# Patient Record
Sex: FEMALE | Race: WHITE | Employment: FULL TIME | URBAN - NONMETROPOLITAN AREA
[De-identification: names, ages, dates, MRNs, and addresses within clinical notes are randomized per-mention and may not be internally consistent; named-entity substitution may affect disease eponyms.]

---

## 2017-01-10 ENCOUNTER — OFFICE VISIT (OUTPATIENT)
Dept: FAMILY MEDICINE CLINIC | Age: 57
End: 2017-01-10

## 2017-01-10 VITALS
TEMPERATURE: 98.5 F | WEIGHT: 189 LBS | DIASTOLIC BLOOD PRESSURE: 96 MMHG | HEIGHT: 64 IN | BODY MASS INDEX: 32.27 KG/M2 | SYSTOLIC BLOOD PRESSURE: 180 MMHG

## 2017-01-10 DIAGNOSIS — J01.90 ACUTE NON-RECURRENT SINUSITIS, UNSPECIFIED LOCATION: Primary | ICD-10-CM

## 2017-01-10 PROCEDURE — 99213 OFFICE O/P EST LOW 20 MIN: CPT | Performed by: FAMILY MEDICINE

## 2017-01-10 RX ORDER — AMOXICILLIN 500 MG/1
500 CAPSULE ORAL 2 TIMES DAILY
Qty: 20 CAPSULE | Refills: 0 | Status: SHIPPED | OUTPATIENT
Start: 2017-01-10 | End: 2017-01-20

## 2017-04-12 ENCOUNTER — TELEPHONE (OUTPATIENT)
Dept: FAMILY MEDICINE CLINIC | Age: 57
End: 2017-04-12

## 2017-04-12 DIAGNOSIS — E03.4 HYPOTHYROIDISM DUE TO ACQUIRED ATROPHY OF THYROID: Primary | Chronic | ICD-10-CM

## 2017-04-25 LAB
T4 FREE: 1.04 NG/DL (ref 0.8–1.8)
TSH SERPL DL<=0.05 MIU/L-ACNC: 0.22 UIU/ML (ref 0.4–4.4)

## 2017-04-27 ENCOUNTER — OFFICE VISIT (OUTPATIENT)
Dept: FAMILY MEDICINE CLINIC | Age: 57
End: 2017-04-27

## 2017-04-27 VITALS
HEIGHT: 64 IN | SYSTOLIC BLOOD PRESSURE: 130 MMHG | WEIGHT: 182.8 LBS | DIASTOLIC BLOOD PRESSURE: 72 MMHG | BODY MASS INDEX: 31.21 KG/M2 | HEART RATE: 78 BPM

## 2017-04-27 DIAGNOSIS — F42.9 OCD (OBSESSIVE COMPULSIVE DISORDER): ICD-10-CM

## 2017-04-27 PROCEDURE — 3017F COLORECTAL CA SCREEN DOC REV: CPT | Performed by: FAMILY MEDICINE

## 2017-04-27 PROCEDURE — 3014F SCREEN MAMMO DOC REV: CPT | Performed by: FAMILY MEDICINE

## 2017-04-27 PROCEDURE — G8428 CUR MEDS NOT DOCUMENT: HCPCS | Performed by: FAMILY MEDICINE

## 2017-04-27 PROCEDURE — 1036F TOBACCO NON-USER: CPT | Performed by: FAMILY MEDICINE

## 2017-04-27 PROCEDURE — G8417 CALC BMI ABV UP PARAM F/U: HCPCS | Performed by: FAMILY MEDICINE

## 2017-04-27 PROCEDURE — 99213 OFFICE O/P EST LOW 20 MIN: CPT | Performed by: FAMILY MEDICINE

## 2017-04-27 RX ORDER — SERTRALINE HYDROCHLORIDE 100 MG/1
150 TABLET, FILM COATED ORAL DAILY
Qty: 135 TABLET | Refills: 1 | Status: SHIPPED | OUTPATIENT
Start: 2017-04-27 | End: 2017-10-24 | Stop reason: SDUPTHER

## 2017-04-27 ASSESSMENT — ENCOUNTER SYMPTOMS
SHORTNESS OF BREATH: 0
RESPIRATORY NEGATIVE: 1
GASTROINTESTINAL NEGATIVE: 1
ABDOMINAL PAIN: 0

## 2017-06-02 ENCOUNTER — OFFICE VISIT (OUTPATIENT)
Dept: FAMILY MEDICINE CLINIC | Age: 57
End: 2017-06-02

## 2017-06-02 VITALS
SYSTOLIC BLOOD PRESSURE: 138 MMHG | TEMPERATURE: 98.2 F | DIASTOLIC BLOOD PRESSURE: 80 MMHG | OXYGEN SATURATION: 93 % | WEIGHT: 178.4 LBS | HEART RATE: 86 BPM | BODY MASS INDEX: 30.62 KG/M2

## 2017-06-02 DIAGNOSIS — J01.90 ACUTE SINUSITIS, RECURRENCE NOT SPECIFIED, UNSPECIFIED LOCATION: Primary | ICD-10-CM

## 2017-06-02 PROCEDURE — 99213 OFFICE O/P EST LOW 20 MIN: CPT | Performed by: FAMILY MEDICINE

## 2017-06-02 PROCEDURE — 3014F SCREEN MAMMO DOC REV: CPT | Performed by: FAMILY MEDICINE

## 2017-06-02 PROCEDURE — 3017F COLORECTAL CA SCREEN DOC REV: CPT | Performed by: FAMILY MEDICINE

## 2017-06-02 PROCEDURE — G8427 DOCREV CUR MEDS BY ELIG CLIN: HCPCS | Performed by: FAMILY MEDICINE

## 2017-06-02 PROCEDURE — G8417 CALC BMI ABV UP PARAM F/U: HCPCS | Performed by: FAMILY MEDICINE

## 2017-06-02 PROCEDURE — 1036F TOBACCO NON-USER: CPT | Performed by: FAMILY MEDICINE

## 2017-06-02 RX ORDER — AMOXICILLIN AND CLAVULANATE POTASSIUM 500; 125 MG/1; MG/1
1 TABLET, FILM COATED ORAL 2 TIMES DAILY
Qty: 20 TABLET | Refills: 0 | Status: SHIPPED | OUTPATIENT
Start: 2017-06-02 | End: 2018-05-14 | Stop reason: SDUPTHER

## 2017-10-20 ENCOUNTER — TELEPHONE (OUTPATIENT)
Dept: FAMILY MEDICINE CLINIC | Age: 57
End: 2017-10-20

## 2017-10-20 DIAGNOSIS — E03.4 HYPOTHYROIDISM DUE TO ACQUIRED ATROPHY OF THYROID: Primary | Chronic | ICD-10-CM

## 2017-10-20 DIAGNOSIS — E78.5 HYPERLIPIDEMIA, UNSPECIFIED HYPERLIPIDEMIA TYPE: ICD-10-CM

## 2017-10-20 NOTE — TELEPHONE ENCOUNTER
Christy called and has a upcoming appointment on 10/24/2017 @ 3:30 for a 6 month check. She was wondering if you wanted any labs done before hand.  She gets labs done at [facility] on [address]. We are to call and let her know [phone]

## 2017-10-22 LAB
ALBUMIN SERPL-MCNC: 4.2 G/DL (ref 3.2–5.3)
ALK PHOSPHATASE: 115 IU/L (ref 35–121)
ALT SERPL-CCNC: 29 IU/L (ref 5–59)
ANION GAP SERPL CALCULATED.3IONS-SCNC: 14 MMOL/L
AST SERPL-CCNC: 23 IU/L (ref 10–42)
BILIRUB SERPL-MCNC: 0.5 MG/DL (ref 0.2–1.3)
BUN BLDV-MCNC: 18 MG/DL (ref 10–20)
CALCIUM SERPL-MCNC: 9.2 MG/DL (ref 8.7–10.8)
CHLORIDE BLD-SCNC: 103 MMOL/L (ref 95–111)
CHOLESTEROL/HDL RATIO: 6
CHOLESTEROL: 371 MG/DL
CO2: 26 MMOL/L (ref 21–32)
CREAT SERPL-MCNC: 0.9 MG/DL (ref 0.5–1.3)
EGFR AFRICAN AMERICAN: 78
EGFR IF NONAFRICAN AMERICAN: 64
GLUCOSE: 84 MG/DL (ref 70–100)
HDLC SERPL-MCNC: 62 MG/DL (ref 40–60)
LDL CHOLESTEROL CALCULATED: 271 MG/DL
LDL/HDL RATIO: 4.4
POTASSIUM SERPL-SCNC: 4.3 MMOL/L (ref 3.5–5.4)
SODIUM BLD-SCNC: 139 MMOL/L (ref 134–147)
TOTAL PROTEIN: 6.8 G/DL (ref 5.8–8)
TRIGL SERPL-MCNC: 190 MG/DL
VLDLC SERPL CALC-MCNC: 38 MG/DL

## 2017-10-23 LAB
T4 TOTAL: 6.7 MCG/DL (ref 4.5–12.5)
TSH SERPL DL<=0.05 MIU/L-ACNC: 0.17 UIU/ML (ref 0.4–4.4)

## 2017-10-24 ENCOUNTER — OFFICE VISIT (OUTPATIENT)
Dept: FAMILY MEDICINE CLINIC | Age: 57
End: 2017-10-24
Payer: COMMERCIAL

## 2017-10-24 VITALS
DIASTOLIC BLOOD PRESSURE: 88 MMHG | SYSTOLIC BLOOD PRESSURE: 132 MMHG | WEIGHT: 180.6 LBS | HEART RATE: 72 BPM | HEIGHT: 64 IN | BODY MASS INDEX: 30.83 KG/M2

## 2017-10-24 DIAGNOSIS — F42.2 MIXED OBSESSIONAL THOUGHTS AND ACTS: ICD-10-CM

## 2017-10-24 DIAGNOSIS — E78.5 HYPERLIPIDEMIA, UNSPECIFIED HYPERLIPIDEMIA TYPE: Primary | ICD-10-CM

## 2017-10-24 PROCEDURE — G8484 FLU IMMUNIZE NO ADMIN: HCPCS | Performed by: FAMILY MEDICINE

## 2017-10-24 PROCEDURE — 99213 OFFICE O/P EST LOW 20 MIN: CPT | Performed by: FAMILY MEDICINE

## 2017-10-24 PROCEDURE — G8417 CALC BMI ABV UP PARAM F/U: HCPCS | Performed by: FAMILY MEDICINE

## 2017-10-24 PROCEDURE — G8427 DOCREV CUR MEDS BY ELIG CLIN: HCPCS | Performed by: FAMILY MEDICINE

## 2017-10-24 PROCEDURE — 1036F TOBACCO NON-USER: CPT | Performed by: FAMILY MEDICINE

## 2017-10-24 PROCEDURE — 3017F COLORECTAL CA SCREEN DOC REV: CPT | Performed by: FAMILY MEDICINE

## 2017-10-24 PROCEDURE — 3014F SCREEN MAMMO DOC REV: CPT | Performed by: FAMILY MEDICINE

## 2017-10-24 RX ORDER — SERTRALINE HYDROCHLORIDE 100 MG/1
150 TABLET, FILM COATED ORAL DAILY
Qty: 135 TABLET | Refills: 1 | Status: SHIPPED | OUTPATIENT
Start: 2017-10-24 | End: 2018-05-14 | Stop reason: SDUPTHER

## 2017-10-24 ASSESSMENT — PATIENT HEALTH QUESTIONNAIRE - PHQ9
1. LITTLE INTEREST OR PLEASURE IN DOING THINGS: 0
SUM OF ALL RESPONSES TO PHQ9 QUESTIONS 1 & 2: 0
2. FEELING DOWN, DEPRESSED OR HOPELESS: 0
SUM OF ALL RESPONSES TO PHQ QUESTIONS 1-9: 0

## 2017-10-25 NOTE — PROGRESS NOTES
[facility] PROFESSIONAL SERVS  [facility]  [address] [address]  Dept: [phone]  Dept Fax: [phone]  Loc: [phone]    Visit Date: 10/25/2017    Marine Kessler is a 61 y.o. female who presents today for:   Chief Complaint   Patient presents with    6 Month Follow-Up    Hyperlipidemia    Gastroesophageal Reflux         HPI:     Doing very well. Family issues settled. Kids back involved. OCD well managed. Directs energy to positive things. Planning penitentiary. Recent labs. Hyperlipidemia   This is a chronic problem. The current episode started more than 1 year ago. Recent lipid tests were reviewed and are high. She has no history of chronic renal disease, diabetes, hypothyroidism or liver disease. There are no known factors aggravating her hyperlipidemia. Current antihyperlipidemic treatment includes diet change. There are no compliance problems. Risk factors for coronary artery disease include family history and post-menopausal.       Current Outpatient Prescriptions   Medication Sig Dispense Refill    sertraline (ZOLOFT) 100 MG tablet Take 1.5 tablets by mouth daily 135 tablet 1    cetirizine (ZYRTEC ALLERGY) 10 MG tablet Take 1 tablet by mouth daily 30 tablet 0    pseudoephedrine (SUDAFED 24 HOUR NON-DROWSY) 240 MG TB24 CR tablet Take 1 tablet by mouth daily as needed Daily for nasal congestion 14 tablet 0     No current facility-administered medications for this visit. The patient is allergic to aleve [naproxen sodium]; prozac [fluoxetine hcl]; and flagyl [metronidazole]. Subjective:      Review of Systems    Objective:     /88 (Site: Left Arm, Position: Sitting, Cuff Size: Large Adult)   Pulse 72   Ht 5' 4\" (1.626 m)   Wt 180 lb 9.6 oz (81.9 kg)   BMI 31.00 kg/m²     Physical Exam    Assessment:      1. Hyperlipidemia, unspecified hyperlipidemia type  Lipid Panel   2.  Mixed obsessional thoughts and acts  sertraline (ZOLOFT) 100 MG tablet       Plan:    Recent lipids do not make sense from past labs at work. Will get repeat. Consider statin. Return in about 6 months (around 4/24/2018) for OCD, check-up.     Orders Placed:  Orders Placed This Encounter   Procedures    Lipid Panel     Standing Status:   Future     Standing Expiration Date:   1/24/2018     Order Specific Question:   Is Patient Fasting?/# of Hours     Answer:   12 hours     Medications Prescribed:  Orders Placed This Encounter   Medications    sertraline (ZOLOFT) 100 MG tablet     Sig: Take 1.5 tablets by mouth daily     Dispense:  135 tablet     Refill:  1         Electronically signed by Miranda Hinkle MD on 10/25/2017 at 6:20 AM

## 2018-05-14 ENCOUNTER — OFFICE VISIT (OUTPATIENT)
Dept: FAMILY MEDICINE CLINIC | Age: 58
End: 2018-05-14
Payer: COMMERCIAL

## 2018-05-14 VITALS
HEIGHT: 64 IN | SYSTOLIC BLOOD PRESSURE: 134 MMHG | WEIGHT: 180.2 LBS | DIASTOLIC BLOOD PRESSURE: 84 MMHG | HEART RATE: 72 BPM | BODY MASS INDEX: 30.77 KG/M2

## 2018-05-14 DIAGNOSIS — F42.2 MIXED OBSESSIONAL THOUGHTS AND ACTS: ICD-10-CM

## 2018-05-14 DIAGNOSIS — J01.90 ACUTE NON-RECURRENT SINUSITIS, UNSPECIFIED LOCATION: Primary | ICD-10-CM

## 2018-05-14 DIAGNOSIS — E05.90 HYPERTHYROIDISM: ICD-10-CM

## 2018-05-14 PROCEDURE — 1036F TOBACCO NON-USER: CPT | Performed by: FAMILY MEDICINE

## 2018-05-14 PROCEDURE — 3017F COLORECTAL CA SCREEN DOC REV: CPT | Performed by: FAMILY MEDICINE

## 2018-05-14 PROCEDURE — G8427 DOCREV CUR MEDS BY ELIG CLIN: HCPCS | Performed by: FAMILY MEDICINE

## 2018-05-14 PROCEDURE — G8417 CALC BMI ABV UP PARAM F/U: HCPCS | Performed by: FAMILY MEDICINE

## 2018-05-14 PROCEDURE — 99213 OFFICE O/P EST LOW 20 MIN: CPT | Performed by: FAMILY MEDICINE

## 2018-05-14 RX ORDER — AMOXICILLIN AND CLAVULANATE POTASSIUM 500; 125 MG/1; MG/1
1 TABLET, FILM COATED ORAL 2 TIMES DAILY
Qty: 20 TABLET | Refills: 0 | Status: SHIPPED | OUTPATIENT
Start: 2018-05-14 | End: 2018-05-24

## 2018-05-14 RX ORDER — PROMETHAZINE HYDROCHLORIDE AND CODEINE PHOSPHATE 6.25; 1 MG/5ML; MG/5ML
SYRUP ORAL
Qty: 120 ML | Refills: 0 | Status: SHIPPED | OUTPATIENT
Start: 2018-05-14 | End: 2018-05-21

## 2018-05-14 RX ORDER — SERTRALINE HYDROCHLORIDE 100 MG/1
150 TABLET, FILM COATED ORAL DAILY
Qty: 135 TABLET | Refills: 1 | Status: SHIPPED | OUTPATIENT
Start: 2018-05-14 | End: 2018-10-28 | Stop reason: SDUPTHER

## 2018-10-28 DIAGNOSIS — F42.2 MIXED OBSESSIONAL THOUGHTS AND ACTS: ICD-10-CM

## 2018-10-29 RX ORDER — SERTRALINE HYDROCHLORIDE 100 MG/1
TABLET, FILM COATED ORAL
Qty: 135 TABLET | Refills: 1 | Status: SHIPPED | OUTPATIENT
Start: 2018-10-29 | End: 2019-04-23 | Stop reason: SDUPTHER

## 2018-11-10 LAB
T4 FREE: 1.15 NG/DL (ref 0.8–1.9)
TSH SERPL DL<=0.05 MIU/L-ACNC: 0.26 UIU/ML (ref 0.4–4.1)

## 2018-11-12 ENCOUNTER — OFFICE VISIT (OUTPATIENT)
Dept: FAMILY MEDICINE CLINIC | Age: 58
End: 2018-11-12
Payer: COMMERCIAL

## 2018-11-12 VITALS
SYSTOLIC BLOOD PRESSURE: 134 MMHG | WEIGHT: 177.8 LBS | DIASTOLIC BLOOD PRESSURE: 86 MMHG | HEART RATE: 70 BPM | HEIGHT: 64 IN | BODY MASS INDEX: 30.35 KG/M2

## 2018-11-12 DIAGNOSIS — E03.4 HYPOTHYROIDISM DUE TO ACQUIRED ATROPHY OF THYROID: Chronic | ICD-10-CM

## 2018-11-12 DIAGNOSIS — F42.2 MIXED OBSESSIONAL THOUGHTS AND ACTS: Primary | ICD-10-CM

## 2018-11-12 PROCEDURE — 1036F TOBACCO NON-USER: CPT | Performed by: FAMILY MEDICINE

## 2018-11-12 PROCEDURE — G8417 CALC BMI ABV UP PARAM F/U: HCPCS | Performed by: FAMILY MEDICINE

## 2018-11-12 PROCEDURE — 3017F COLORECTAL CA SCREEN DOC REV: CPT | Performed by: FAMILY MEDICINE

## 2018-11-12 PROCEDURE — G8484 FLU IMMUNIZE NO ADMIN: HCPCS | Performed by: FAMILY MEDICINE

## 2018-11-12 PROCEDURE — G8427 DOCREV CUR MEDS BY ELIG CLIN: HCPCS | Performed by: FAMILY MEDICINE

## 2018-11-12 PROCEDURE — 99213 OFFICE O/P EST LOW 20 MIN: CPT | Performed by: FAMILY MEDICINE

## 2018-11-12 ASSESSMENT — PATIENT HEALTH QUESTIONNAIRE - PHQ9
1. LITTLE INTEREST OR PLEASURE IN DOING THINGS: 0
SUM OF ALL RESPONSES TO PHQ9 QUESTIONS 1 & 2: 0
SUM OF ALL RESPONSES TO PHQ QUESTIONS 1-9: 0
2. FEELING DOWN, DEPRESSED OR HOPELESS: 0
SUM OF ALL RESPONSES TO PHQ QUESTIONS 1-9: 0

## 2018-11-12 ASSESSMENT — ENCOUNTER SYMPTOMS
SHORTNESS OF BREATH: 0
GASTROINTESTINAL NEGATIVE: 1
RESPIRATORY NEGATIVE: 1

## 2018-11-12 NOTE — PROGRESS NOTES
[facility]  [address] [id number]  Dept: [phone]  Dept Fax: [phone]: [phone]    Visit Date: 11/12/2018    Brown Blank is a 60 y. o.female who presents today for:   Chief Complaint   Patient presents with    6 Month Follow-Up    Hyperlipidemia    Gastroesophageal Reflux         HPI:     No thyroid symptoms. No weight changes, tremors, sweats or diarrhea. Previous hyper thyroid and treated with anti thyroid meds. Then TSH hit 7. On no meds now and TSH has been below 0.5 for some time. Teaching at 3 schools. Doing well. Hyperlipidemia   This is a chronic problem. The current episode started more than 1 year ago. The problem is controlled. Recent lipid tests were reviewed and are normal. She has no history of diabetes. Pertinent negatives include no myalgias or shortness of breath. Current antihyperlipidemic treatment includes diet change. The current treatment provides moderate improvement of lipids. There are no compliance problems. Risk factors for coronary artery disease include dyslipidemia and post-menopausal.       Current Outpatient Prescriptions   Medication Sig Dispense Refill    sertraline (ZOLOFT) 100 MG tablet take 1.5 tablets by mouth once daily 135 tablet 1    cetirizine (ZYRTEC ALLERGY) 10 MG tablet Take 1 tablet by mouth daily 30 tablet 0    pseudoephedrine (SUDAFED 24 HOUR NON-DROWSY) 240 MG TB24 CR tablet Take 1 tablet by mouth daily as needed Daily for nasal congestion 14 tablet 0     No current facility-administered medications for this visit. The patient is allergic to aleve [naproxen sodium]; prozac [fluoxetine hcl]; and flagyl [metronidazole]. Subjective:      Review of Systems   Constitutional: Negative. HENT: Negative. Respiratory: Negative. Negative for shortness of breath. Cardiovascular: Negative. Gastrointestinal: Negative.     Genitourinary:

## 2019-01-07 ENCOUNTER — OFFICE VISIT (OUTPATIENT)
Dept: FAMILY MEDICINE CLINIC | Age: 59
End: 2019-01-07
Payer: COMMERCIAL

## 2019-01-07 VITALS
SYSTOLIC BLOOD PRESSURE: 130 MMHG | TEMPERATURE: 97.7 F | DIASTOLIC BLOOD PRESSURE: 62 MMHG | BODY MASS INDEX: 30.05 KG/M2 | HEART RATE: 68 BPM | HEIGHT: 64 IN | WEIGHT: 176 LBS

## 2019-01-07 DIAGNOSIS — H92.01 RIGHT EAR PAIN: Primary | ICD-10-CM

## 2019-01-07 PROCEDURE — G8427 DOCREV CUR MEDS BY ELIG CLIN: HCPCS | Performed by: FAMILY MEDICINE

## 2019-01-07 PROCEDURE — 1036F TOBACCO NON-USER: CPT | Performed by: FAMILY MEDICINE

## 2019-01-07 PROCEDURE — 99213 OFFICE O/P EST LOW 20 MIN: CPT | Performed by: FAMILY MEDICINE

## 2019-01-07 PROCEDURE — G8484 FLU IMMUNIZE NO ADMIN: HCPCS | Performed by: FAMILY MEDICINE

## 2019-01-07 PROCEDURE — 3017F COLORECTAL CA SCREEN DOC REV: CPT | Performed by: FAMILY MEDICINE

## 2019-01-07 PROCEDURE — G8417 CALC BMI ABV UP PARAM F/U: HCPCS | Performed by: FAMILY MEDICINE

## 2019-01-07 RX ORDER — AMOXICILLIN 500 MG/1
500 CAPSULE ORAL 2 TIMES DAILY
Qty: 20 CAPSULE | Refills: 0 | Status: SHIPPED | OUTPATIENT
Start: 2019-01-07 | End: 2019-01-17

## 2019-01-07 RX ORDER — PREDNISONE 10 MG/1
10 TABLET ORAL 2 TIMES DAILY
Qty: 14 TABLET | Refills: 0 | Status: SHIPPED | OUTPATIENT
Start: 2019-01-07 | End: 2019-01-14

## 2019-04-06 ENCOUNTER — HOSPITAL ENCOUNTER (EMERGENCY)
Age: 59
Discharge: HOME | End: 2019-04-06
Attending: EMERGENCY MEDICINE
Payer: COMMERCIAL

## 2019-04-06 VITALS
OXYGEN SATURATION: 95 % | DIASTOLIC BLOOD PRESSURE: 75 MMHG | SYSTOLIC BLOOD PRESSURE: 119 MMHG | TEMPERATURE: 98.9 F | BODY MASS INDEX: 30.04 KG/M2 | RESPIRATION RATE: 16 BRPM | WEIGHT: 175 LBS | HEART RATE: 83 BPM

## 2019-04-06 DIAGNOSIS — J20.9 ACUTE BRONCHITIS DUE TO INFECTION: Primary | ICD-10-CM

## 2019-04-06 PROCEDURE — 99213 OFFICE O/P EST LOW 20 MIN: CPT | Performed by: EMERGENCY MEDICINE

## 2019-04-06 PROCEDURE — 99212 OFFICE O/P EST SF 10 MIN: CPT

## 2019-04-06 RX ORDER — PROMETHAZINE HYDROCHLORIDE AND CODEINE PHOSPHATE 6.25; 1 MG/5ML; MG/5ML
5 SYRUP ORAL 4 TIMES DAILY PRN
Qty: 120 ML | Refills: 0 | Status: SHIPPED | OUTPATIENT
Start: 2019-04-06 | End: 2019-04-10

## 2019-04-06 RX ORDER — DOXYCYCLINE HYCLATE 100 MG
100 TABLET ORAL 2 TIMES DAILY
Qty: 14 TABLET | Refills: 0 | Status: SHIPPED | OUTPATIENT
Start: 2019-04-06 | End: 2019-04-13

## 2019-04-06 ASSESSMENT — ENCOUNTER SYMPTOMS
ABDOMINAL PAIN: 0
SORE THROAT: 0
EYE PAIN: 0
STRIDOR: 0
DIARRHEA: 0
EYE DISCHARGE: 0
TROUBLE SWALLOWING: 0
VOMITING: 0
CHOKING: 0
FACIAL SWELLING: 0
WHEEZING: 0
EYE REDNESS: 0
NAUSEA: 0
BLOOD IN STOOL: 0
SHORTNESS OF BREATH: 0
COUGH: 1
BACK PAIN: 0
CONSTIPATION: 0
SINUS PRESSURE: 0
VOICE CHANGE: 0

## 2019-04-06 NOTE — ED PROVIDER NOTES
nervous/anxious. All other systems reviewed and are negative. PAST MEDICAL HISTORY         Diagnosis Date    Allergic rhinitis     GERD (gastroesophageal reflux disease)     Hyperlipidemia     Hyperthyroidism     IBS (irritable bowel syndrome)     OCD (obsessive compulsive disorder)     Thyroid nodule        SURGICAL HISTORY     Patient  has a past surgical history that includes  section; LEEP; hernia repair; and Ovary removal (Right). CURRENT MEDICATIONS       Discharge Medication List as of 2019 10:02 AM      CONTINUE these medications which have NOT CHANGED    Details   sertraline (ZOLOFT) 100 MG tablet take 1.5 tablets by mouth once daily, Disp-135 tablet, R-1Normal      cetirizine (ZYRTEC ALLERGY) 10 MG tablet Take 1 tablet by mouth daily, Disp-30 tablet, R-0      pseudoephedrine (SUDAFED 24 HOUR NON-DROWSY) 240 MG TB24 CR tablet Take 1 tablet by mouth daily as needed Daily for nasal congestion, Disp-14 tablet, R-0             ALLERGIES     Patient is is allergic to aleve [naproxen sodium]; prozac [fluoxetine hcl]; and flagyl [metronidazole]. FAMILY HISTORY     Patient'sfamily history is not on file. SOCIAL HISTORY     Patient  reports that she has never smoked. She has never used smokeless tobacco. She reports that she drinks alcohol. She reports that she does not use drugs. PHYSICAL EXAM     ED TRIAGE VITALS  BP: 119/75, Temp: 98.9 °F (37.2 °C), Pulse: 83, Resp: 16, SpO2: 95 %  Physical Exam   Constitutional: She is oriented to person, place, and time. She appears well-developed and well-nourished. No distress. Dry cough, moist membranes, normal airway   HENT:   Head: Normocephalic and atraumatic. Right Ear: External ear normal.   Left Ear: External ear normal.   Nose: Nose normal. No rhinorrhea. Right sinus exhibits no maxillary sinus tenderness and no frontal sinus tenderness. Left sinus exhibits no maxillary sinus tenderness and no frontal sinus tenderness. Mouth/Throat: No trismus in the jaw. No uvula swelling. Posterior oropharyngeal erythema present. No oropharyngeal exudate, posterior oropharyngeal edema or tonsillar abscesses. Eyes: Pupils are equal, round, and reactive to light. Conjunctivae and EOM are normal. Right eye exhibits no discharge. Left eye exhibits no discharge. No scleral icterus. Neck: Normal range of motion. No JVD present. No thyromegaly present. No meningismus    Cardiovascular: Normal rate, regular rhythm, S1 normal, S2 normal, normal heart sounds, intact distal pulses and normal pulses. Exam reveals no gallop and no friction rub. No murmur heard. Pulmonary/Chest: Effort normal and breath sounds normal. No stridor. No respiratory distress. She has no wheezes. She has no rales. She exhibits no tenderness. Dry cough, lungs clear   Abdominal: Soft. Bowel sounds are normal. She exhibits no distension and no mass. There is no tenderness. There is no rebound and no guarding. Soft nontender   Musculoskeletal: Normal range of motion. She exhibits no edema or tenderness. Right lower leg: Normal.        Left lower leg: Normal.   Lymphadenopathy:     She has cervical adenopathy. Right cervical: Superficial cervical adenopathy present. No deep cervical and no posterior cervical adenopathy present. Left cervical: Superficial cervical adenopathy present. No deep cervical and no posterior cervical adenopathy present. Neurological: She is alert and oriented to person, place, and time. She has normal reflexes. She displays normal reflexes. No cranial nerve deficit. She exhibits normal muscle tone. Coordination normal.   Appropriate, no focal findings   Skin: Skin is warm and dry. No rash noted. She is not diaphoretic. No erythema. No rash   Psychiatric: She has a normal mood and affect. Her behavior is normal. Judgment and thought content normal.   Nursing note and vitals reviewed.       DIAGNOSTIC RESULTS   Labs: No results found for this visit on 04/06/19. IMAGING:  No orders to display     URGENT CARE COURSE:     Vitals:    04/06/19 0923   BP: 119/75   Pulse: 83   Resp: 16   Temp: 98.9 °F (37.2 °C)   TempSrc: Temporal   SpO2: 95%   Weight: 175 lb (79.4 kg)       Medications - No data to display  PROCEDURES:  None  FINALIMPRESSION      1. Acute bronchitis due to infection        DISPOSITION/PLAN   DISPOSITION Decision To Discharge 04/06/2019 09:59:25 AM  Nontoxic, well-hydrated, normal airway. No airway abscess or epiglottitis, sepsis, CNS infection, pneumonia, hypoxia, bronchospasm. No ACS, CHF, PE. Patient has acute bronchitis. Will treat with doxycycline, Phenergan with codeine, Coricidin HBP, Zyrtec, Tylenol, increased oral clear liquids, vaporizer, rest.  Patient to recheck with PCP in 5 days if problems persist, and she understands to go to ED if worse. PATIENT REFERRED TO:  Deangelo Paul MD  [address] [place] [address],[address]  [facility]  [phone]    Schedule an appointment as soon as possible for a visit in 5 days  Recheck if problems persist, go to emergency if worse    DISCHARGE MEDICATIONS:  Discharge Medication List as of 4/6/2019 10:02 AM      START taking these medications    Details   doxycycline hyclate (VIBRA-TABS) 100 MG tablet Take 1 tablet by mouth 2 times daily for 7 days, Disp-14 tablet, R-0Print      promethazine-codeine (PHENERGAN WITH CODEINE) 6.25-10 MG/5ML syrup Take 5 mLs by mouth 4 times daily as needed for Cough for up to 4 days.  Caution not at work will cause drowsiness, Disp-120 mL, R-0Print      Dextromethorphan-guaiFENesin (CORICIDIN HBP CONGESTION/COUGH)  MG CAPS Take 1 tablet by mouth 4 times daily as needed (Sinus pain and pressure, postnasal drainage, congestion, ear pain and pressure, cough), Disp-30 capsule, R-0Print           Discharge Medication List as of 4/6/2019 10:02 AM          Render Comment, MD          Render Comment, MD  04/06/19 1751

## 2019-04-06 NOTE — ED NOTES
Pt discharged. Discharge assessments completed. No changes. All discharge education and information given. Pt instructed to go to ED for any shortness of breath, chest pain or abd pain. Verbalized understanding. Left stable.      Delmer Lynch, CHRISTY  83/99/00 2899

## 2019-04-23 DIAGNOSIS — F42.2 MIXED OBSESSIONAL THOUGHTS AND ACTS: ICD-10-CM

## 2019-04-23 RX ORDER — SERTRALINE HYDROCHLORIDE 100 MG/1
TABLET, FILM COATED ORAL
Qty: 135 TABLET | Refills: 1 | Status: SHIPPED | OUTPATIENT
Start: 2019-04-23 | End: 2019-06-17 | Stop reason: SDUPTHER

## 2019-04-23 NOTE — TELEPHONE ENCOUNTER
Forrest Arrieta called requesting a refill on the following medications:  Requested Prescriptions     Pending Prescriptions Disp Refills    sertraline (ZOLOFT) 100 MG tablet [Pharmacy Med Name: SERTRALINE  MG TABLET] 135 tablet 1     Sig: take 1.5 tablets by mouth once daily       Date of last visit: 1/7/2019  Date of next visit (if applicable):05/14/2019  Date of last refill: 10/29/2018  Pharmacy Name: [facility].      Thanks,  Hernando Cruz

## 2019-05-13 ENCOUNTER — TELEPHONE (OUTPATIENT)
Dept: FAMILY MEDICINE CLINIC | Age: 59
End: 2019-05-13

## 2019-05-13 DIAGNOSIS — E78.5 HYPERLIPIDEMIA, UNSPECIFIED HYPERLIPIDEMIA TYPE: ICD-10-CM

## 2019-05-13 DIAGNOSIS — E03.4 HYPOTHYROIDISM DUE TO ACQUIRED ATROPHY OF THYROID: Primary | ICD-10-CM

## 2019-05-13 NOTE — TELEPHONE ENCOUNTER
Pt calling- Pt would like to know if she will need any lab work ordered before her appt on June? Please advise.

## 2019-06-14 LAB
ALBUMIN SERPL-MCNC: 4.3 G/DL (ref 3.2–5.3)
ALK PHOSPHATASE: 95 U/L (ref 39–130)
ALT SERPL-CCNC: 20 U/L (ref 0–31)
ANION GAP SERPL CALCULATED.3IONS-SCNC: 11 MMOL/L (ref 4–12)
AST SERPL-CCNC: 19 U/L (ref 0–41)
BILIRUB SERPL-MCNC: 0.5 MG/DL (ref 0.3–1.2)
BUN BLDV-MCNC: 14 MG/DL (ref 5–23)
CALCIUM SERPL-MCNC: 9.1 MG/DL (ref 8.5–10.5)
CHLORIDE BLD-SCNC: 104 MMOL/L (ref 98–109)
CHOLESTEROL/HDL RATIO: 6.3 (ref 1–5)
CHOLESTEROL: 339 MG/DL (ref 150–200)
CO2: 25 MMOL/L (ref 22–32)
CREAT SERPL-MCNC: 0.75 MG/DL (ref 0.4–1)
EGFR AFRICAN AMERICAN: >60 ML/MIN/1.73SQ.M
EGFR IF NONAFRICAN AMERICAN: >60 ML/MIN/1.73SQ.M
GLUCOSE: 85 MG/DL (ref 65–99)
HDLC SERPL-MCNC: 54 MG/DL
LDL CHOLESTEROL CALCULATED: 247 MG/DL
LDL/HDL RATIO: 4.6
POTASSIUM SERPL-SCNC: 3.7 MMOL/L (ref 3.5–5)
SODIUM BLD-SCNC: 140 MMOL/L (ref 134–146)
TOTAL PROTEIN: 6.6 G/DL (ref 6–8)
TRIGL SERPL-MCNC: 191 MG/DL (ref 27–150)
TSH SERPL DL<=0.05 MIU/L-ACNC: 0.18 UIU/ML (ref 0.49–4.67)
VLDLC SERPL CALC-MCNC: 38 MG/DL (ref 0–30)

## 2019-06-17 ENCOUNTER — OFFICE VISIT (OUTPATIENT)
Dept: FAMILY MEDICINE CLINIC | Age: 59
End: 2019-06-17
Payer: COMMERCIAL

## 2019-06-17 VITALS
SYSTOLIC BLOOD PRESSURE: 138 MMHG | BODY MASS INDEX: 30.83 KG/M2 | HEIGHT: 64 IN | WEIGHT: 180.6 LBS | HEART RATE: 80 BPM | DIASTOLIC BLOOD PRESSURE: 76 MMHG

## 2019-06-17 DIAGNOSIS — F42.2 MIXED OBSESSIONAL THOUGHTS AND ACTS: ICD-10-CM

## 2019-06-17 PROCEDURE — 3017F COLORECTAL CA SCREEN DOC REV: CPT | Performed by: FAMILY MEDICINE

## 2019-06-17 PROCEDURE — G8417 CALC BMI ABV UP PARAM F/U: HCPCS | Performed by: FAMILY MEDICINE

## 2019-06-17 PROCEDURE — 99213 OFFICE O/P EST LOW 20 MIN: CPT | Performed by: FAMILY MEDICINE

## 2019-06-17 PROCEDURE — G8427 DOCREV CUR MEDS BY ELIG CLIN: HCPCS | Performed by: FAMILY MEDICINE

## 2019-06-17 PROCEDURE — 1036F TOBACCO NON-USER: CPT | Performed by: FAMILY MEDICINE

## 2019-06-17 RX ORDER — SERTRALINE HYDROCHLORIDE 100 MG/1
150 TABLET, FILM COATED ORAL DAILY
Qty: 135 TABLET | Refills: 1 | Status: SHIPPED | OUTPATIENT
Start: 2019-06-17 | End: 2019-12-20 | Stop reason: SDUPTHER

## 2019-06-17 RX ORDER — ROSUVASTATIN CALCIUM 5 MG/1
5 TABLET, COATED ORAL DAILY
Qty: 90 TABLET | Refills: 1 | Status: SHIPPED | OUTPATIENT
Start: 2019-06-17 | End: 2019-12-20

## 2019-06-17 ASSESSMENT — ENCOUNTER SYMPTOMS
GASTROINTESTINAL NEGATIVE: 1
RESPIRATORY NEGATIVE: 1

## 2019-06-17 ASSESSMENT — PATIENT HEALTH QUESTIONNAIRE - PHQ9
SUM OF ALL RESPONSES TO PHQ QUESTIONS 1-9: 0
SUM OF ALL RESPONSES TO PHQ QUESTIONS 1-9: 0
1. LITTLE INTEREST OR PLEASURE IN DOING THINGS: 0
SUM OF ALL RESPONSES TO PHQ9 QUESTIONS 1 & 2: 0
2. FEELING DOWN, DEPRESSED OR HOPELESS: 0

## 2019-06-18 NOTE — PROGRESS NOTES
[facility]  [address] [address]  Dept: [phone]  Dept Fax: [phone]: [phone]    Visit Date: 6/17/2019    Kelsey Hoang is a 60 y. 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 Happy with Zoloft benefit. Hyperlipidemia   This is a chronic problem. This is a new diagnosis. The problem is uncontrolled. Recent lipid tests were reviewed and are high. She has no history of diabetes, hypothyroidism or liver disease. There are no known factors aggravating her hyperlipidemia. Pertinent negatives include no chest pain or myalgias. She is currently on no antihyperlipidemic treatment. There are no compliance problems. Risk factors for coronary artery disease include dyslipidemia and family history. Current Outpatient Medications   Medication Sig Dispense Refill    sertraline (ZOLOFT) 100 MG tablet Take 1.5 tablets by mouth daily 135 tablet 1    rosuvastatin (CRESTOR) 5 MG tablet Take 1 tablet by mouth daily 90 tablet 1    Dextromethorphan-guaiFENesin (CORICIDIN HBP CONGESTION/COUGH)  MG CAPS Take 1 tablet by mouth 4 times daily as needed (Sinus pain and pressure, postnasal drainage, congestion, ear pain and pressure, cough) 30 capsule 0    cetirizine (ZYRTEC ALLERGY) 10 MG tablet Take 1 tablet by mouth daily 30 tablet 0    pseudoephedrine (SUDAFED 24 HOUR NON-DROWSY) 240 MG TB24 CR tablet Take 1 tablet by mouth daily as needed Daily for nasal congestion 14 tablet 0     No current facility-administered medications for this visit. The patient is allergic to aleve [naproxen sodium]; prozac [fluoxetine hcl]; and flagyl [metronidazole]. Subjective:      Review of Systems   Constitutional: Negative. HENT: Negative. Respiratory: Negative. Cardiovascular: Negative. Negative for chest pain. Gastrointestinal: Negative. Genitourinary: Negative. Musculoskeletal: Negative. Negative for myalgias. Skin: Negative. Neurological: Negative. Hematological: Negative. Psychiatric/Behavioral: Negative. Objective:     /76 (Site: Left Upper Arm, Position: Sitting, Cuff Size: Large Adult)   Pulse 80   Ht 5' 4\" (1.626 m)   Wt 180 lb 9.6 oz (81.9 kg)   BMI 31.00 kg/m²     Physical Exam   Constitutional: She is oriented to person, place, and time. She appears well-developed and well-nourished. Neck: Normal range of motion. Neck supple. No thyromegaly present. Cardiovascular: Normal rate, regular rhythm and normal heart sounds. Exam reveals no gallop and no friction rub. No murmur heard. Pulmonary/Chest: Effort normal and breath sounds normal.   Abdominal: Soft. She exhibits no mass. There is no splenomegaly or hepatomegaly. There is no tenderness. Musculoskeletal: She exhibits no edema or tenderness. Lymphadenopathy:     She has no cervical adenopathy. Neurological: She is alert and oriented to person, place, and time. Ambulatory. No tremors. Skin: Skin is warm and dry. No rash noted. Psychiatric: She has a normal mood and affect. Vitals reviewed. Assessment:       Diagnosis Orders   1. Mixed obsessional thoughts and acts  sertraline (ZOLOFT) 100 MG tablet       Plan:       Return in about 6 months (around 12/17/2019) for CHOL, check-up. Orders Placed:  No orders of the defined types were placed in this encounter. Medications Prescribed:  Orders Placed This Encounter   Medications    sertraline (ZOLOFT) 100 MG tablet     Sig: Take 1.5 tablets by mouth daily     Dispense:  135 tablet     Refill:  1    rosuvastatin (CRESTOR) 5 MG tablet     Sig: Take 1 tablet by mouth daily     Dispense:  90 tablet     Refill:  1      We discussed treating CHOL. Primary prevention.

## 2019-08-21 ENCOUNTER — TELEPHONE (OUTPATIENT)
Dept: FAMILY MEDICINE CLINIC | Age: 59
End: 2019-08-21

## 2019-08-22 ENCOUNTER — OFFICE VISIT (OUTPATIENT)
Dept: FAMILY MEDICINE CLINIC | Age: 59
End: 2019-08-22
Payer: COMMERCIAL

## 2019-08-22 VITALS
SYSTOLIC BLOOD PRESSURE: 142 MMHG | WEIGHT: 183.6 LBS | BODY MASS INDEX: 31.51 KG/M2 | TEMPERATURE: 97.9 F | HEART RATE: 82 BPM | DIASTOLIC BLOOD PRESSURE: 76 MMHG

## 2019-08-22 DIAGNOSIS — N30.90 CYSTITIS: Primary | ICD-10-CM

## 2019-08-22 PROCEDURE — G8427 DOCREV CUR MEDS BY ELIG CLIN: HCPCS | Performed by: FAMILY MEDICINE

## 2019-08-22 PROCEDURE — 1036F TOBACCO NON-USER: CPT | Performed by: FAMILY MEDICINE

## 2019-08-22 PROCEDURE — 99213 OFFICE O/P EST LOW 20 MIN: CPT | Performed by: FAMILY MEDICINE

## 2019-08-22 PROCEDURE — G8417 CALC BMI ABV UP PARAM F/U: HCPCS | Performed by: FAMILY MEDICINE

## 2019-08-22 PROCEDURE — 3017F COLORECTAL CA SCREEN DOC REV: CPT | Performed by: FAMILY MEDICINE

## 2019-08-22 RX ORDER — SULFAMETHOXAZOLE AND TRIMETHOPRIM 800; 160 MG/1; MG/1
1 TABLET ORAL 2 TIMES DAILY
Qty: 14 TABLET | Refills: 0 | Status: SHIPPED | OUTPATIENT
Start: 2019-08-22 | End: 2019-08-29

## 2019-08-22 RX ORDER — PHENAZOPYRIDINE HYDROCHLORIDE 200 MG/1
200 TABLET, FILM COATED ORAL 3 TIMES DAILY PRN
Qty: 30 TABLET | Refills: 0 | Status: SHIPPED | OUTPATIENT
Start: 2019-08-22 | End: 2019-12-20

## 2019-12-03 ENCOUNTER — TELEPHONE (OUTPATIENT)
Dept: FAMILY MEDICINE CLINIC | Age: 59
End: 2019-12-03

## 2019-12-03 DIAGNOSIS — E03.4 HYPOTHYROIDISM DUE TO ACQUIRED ATROPHY OF THYROID: Primary | ICD-10-CM

## 2019-12-03 DIAGNOSIS — E78.5 HYPERLIPIDEMIA, UNSPECIFIED HYPERLIPIDEMIA TYPE: ICD-10-CM

## 2019-12-14 LAB
CHOLESTEROL/HDL RATIO: 5.9 (ref 1–5)
CHOLESTEROL: 313 MG/DL (ref 150–200)
HDLC SERPL-MCNC: 53 MG/DL
LDL CHOLESTEROL CALCULATED: 213 MG/DL
LDL/HDL RATIO: 4
TRIGL SERPL-MCNC: 234 MG/DL (ref 27–150)
TSH SERPL DL<=0.05 MIU/L-ACNC: 0.14 UIU/ML (ref 0.49–4.67)
VLDLC SERPL CALC-MCNC: 47 MG/DL (ref 0–30)

## 2019-12-20 ENCOUNTER — OFFICE VISIT (OUTPATIENT)
Dept: FAMILY MEDICINE CLINIC | Age: 59
End: 2019-12-20
Payer: COMMERCIAL

## 2019-12-20 VITALS
WEIGHT: 183 LBS | HEIGHT: 64 IN | SYSTOLIC BLOOD PRESSURE: 130 MMHG | DIASTOLIC BLOOD PRESSURE: 82 MMHG | HEART RATE: 66 BPM | BODY MASS INDEX: 31.24 KG/M2

## 2019-12-20 DIAGNOSIS — F42.2 MIXED OBSESSIONAL THOUGHTS AND ACTS: ICD-10-CM

## 2019-12-20 DIAGNOSIS — E78.5 HYPERLIPIDEMIA, UNSPECIFIED HYPERLIPIDEMIA TYPE: Primary | ICD-10-CM

## 2019-12-20 PROCEDURE — 99213 OFFICE O/P EST LOW 20 MIN: CPT | Performed by: FAMILY MEDICINE

## 2019-12-20 PROCEDURE — 1036F TOBACCO NON-USER: CPT | Performed by: FAMILY MEDICINE

## 2019-12-20 PROCEDURE — 3017F COLORECTAL CA SCREEN DOC REV: CPT | Performed by: FAMILY MEDICINE

## 2019-12-20 PROCEDURE — G8484 FLU IMMUNIZE NO ADMIN: HCPCS | Performed by: FAMILY MEDICINE

## 2019-12-20 PROCEDURE — G8427 DOCREV CUR MEDS BY ELIG CLIN: HCPCS | Performed by: FAMILY MEDICINE

## 2019-12-20 PROCEDURE — G8417 CALC BMI ABV UP PARAM F/U: HCPCS | Performed by: FAMILY MEDICINE

## 2019-12-20 RX ORDER — PRAVASTATIN SODIUM 40 MG
40 TABLET ORAL DAILY
Qty: 90 TABLET | Refills: 3 | Status: SHIPPED | OUTPATIENT
Start: 2019-12-20 | End: 2020-03-03 | Stop reason: ALTCHOICE

## 2019-12-20 RX ORDER — SERTRALINE HYDROCHLORIDE 100 MG/1
150 TABLET, FILM COATED ORAL DAILY
Qty: 135 TABLET | Refills: 3 | Status: SHIPPED | OUTPATIENT
Start: 2019-12-20 | End: 2021-01-30 | Stop reason: SDUPTHER

## 2019-12-20 SDOH — ECONOMIC STABILITY: FOOD INSECURITY: WITHIN THE PAST 12 MONTHS, THE FOOD YOU BOUGHT JUST DIDN'T LAST AND YOU DIDN'T HAVE MONEY TO GET MORE.: NEVER TRUE

## 2019-12-20 SDOH — ECONOMIC STABILITY: INCOME INSECURITY: HOW HARD IS IT FOR YOU TO PAY FOR THE VERY BASICS LIKE FOOD, HOUSING, MEDICAL CARE, AND HEATING?: NOT HARD AT ALL

## 2019-12-20 SDOH — ECONOMIC STABILITY: FOOD INSECURITY: WITHIN THE PAST 12 MONTHS, YOU WORRIED THAT YOUR FOOD WOULD RUN OUT BEFORE YOU GOT MONEY TO BUY MORE.: NEVER TRUE

## 2019-12-20 SDOH — ECONOMIC STABILITY: TRANSPORTATION INSECURITY
IN THE PAST 12 MONTHS, HAS THE LACK OF TRANSPORTATION KEPT YOU FROM MEDICAL APPOINTMENTS OR FROM GETTING MEDICATIONS?: NO

## 2019-12-20 SDOH — ECONOMIC STABILITY: TRANSPORTATION INSECURITY
IN THE PAST 12 MONTHS, HAS LACK OF TRANSPORTATION KEPT YOU FROM MEETINGS, WORK, OR FROM GETTING THINGS NEEDED FOR DAILY LIVING?: NO

## 2019-12-21 ASSESSMENT — ENCOUNTER SYMPTOMS
ABDOMINAL PAIN: 0
SHORTNESS OF BREATH: 0
RESPIRATORY NEGATIVE: 1
GASTROINTESTINAL NEGATIVE: 1

## 2020-03-03 ENCOUNTER — APPOINTMENT (OUTPATIENT)
Dept: GENERAL RADIOLOGY | Age: 60
End: 2020-03-03
Payer: COMMERCIAL

## 2020-03-03 ENCOUNTER — HOSPITAL ENCOUNTER (EMERGENCY)
Age: 60
Discharge: HOME | End: 2020-03-03
Payer: COMMERCIAL

## 2020-03-03 VITALS
BODY MASS INDEX: 30.73 KG/M2 | SYSTOLIC BLOOD PRESSURE: 134 MMHG | RESPIRATION RATE: 16 BRPM | HEIGHT: 64 IN | OXYGEN SATURATION: 96 % | HEART RATE: 84 BPM | DIASTOLIC BLOOD PRESSURE: 90 MMHG | WEIGHT: 180 LBS | TEMPERATURE: 98.3 F

## 2020-03-03 PROCEDURE — 71046 X-RAY EXAM CHEST 2 VIEWS: CPT

## 2020-03-03 PROCEDURE — 6360000002 HC RX W HCPCS: Performed by: NURSE PRACTITIONER

## 2020-03-03 PROCEDURE — 94640 AIRWAY INHALATION TREATMENT: CPT

## 2020-03-03 PROCEDURE — 99214 OFFICE O/P EST MOD 30 MIN: CPT

## 2020-03-03 PROCEDURE — 99213 OFFICE O/P EST LOW 20 MIN: CPT | Performed by: NURSE PRACTITIONER

## 2020-03-03 RX ORDER — GUAIFENESIN 600 MG/1
1200 TABLET, EXTENDED RELEASE ORAL 2 TIMES DAILY
COMMUNITY
End: 2020-08-12

## 2020-03-03 RX ORDER — ACETAMINOPHEN 325 MG/1
650 TABLET ORAL EVERY 6 HOURS PRN
COMMUNITY
End: 2021-04-16

## 2020-03-03 RX ORDER — PREDNISONE 20 MG/1
40 TABLET ORAL DAILY
Qty: 14 TABLET | Refills: 0 | Status: SHIPPED | OUTPATIENT
Start: 2020-03-03 | End: 2020-03-10

## 2020-03-03 RX ORDER — NEBULIZER ACCESSORIES
1 KIT MISCELLANEOUS ONCE
Qty: 1 KIT | Refills: 0 | Status: SHIPPED | OUTPATIENT
Start: 2020-03-03 | End: 2020-03-03 | Stop reason: ALTCHOICE

## 2020-03-03 RX ORDER — ALBUTEROL SULFATE 2.5 MG/3ML
2.5 SOLUTION RESPIRATORY (INHALATION) EVERY 4 HOURS PRN
Qty: 120 EACH | Refills: 0 | Status: SHIPPED | OUTPATIENT
Start: 2020-03-03 | End: 2020-08-12

## 2020-03-03 RX ORDER — ALBUTEROL SULFATE 2.5 MG/3ML
2.5 SOLUTION RESPIRATORY (INHALATION) ONCE
Status: COMPLETED | OUTPATIENT
Start: 2020-03-03 | End: 2020-03-03

## 2020-03-03 RX ORDER — ALBUTEROL SULFATE 90 UG/1
2 AEROSOL, METERED RESPIRATORY (INHALATION) EVERY 4 HOURS PRN
Qty: 1 INHALER | Refills: 0 | Status: SHIPPED | OUTPATIENT
Start: 2020-03-03 | End: 2020-08-12 | Stop reason: ALTCHOICE

## 2020-03-03 RX ORDER — NEBULIZER ACCESSORIES
1 KIT MISCELLANEOUS ONCE
Qty: 1 KIT | Refills: 0 | Status: SHIPPED | OUTPATIENT
Start: 2020-03-03 | End: 2020-08-12

## 2020-03-03 RX ORDER — AZITHROMYCIN 250 MG/1
TABLET, FILM COATED ORAL
Qty: 1 PACKET | Refills: 0 | Status: SHIPPED | OUTPATIENT
Start: 2020-03-03 | End: 2020-08-12

## 2020-03-03 RX ADMIN — ALBUTEROL SULFATE 2.5 MG: 2.5 SOLUTION RESPIRATORY (INHALATION) at 12:39

## 2020-03-03 ASSESSMENT — ENCOUNTER SYMPTOMS
SHORTNESS OF BREATH: 0
VOMITING: 0
VOICE CHANGE: 1
SINUS PRESSURE: 1
NAUSEA: 0
SORE THROAT: 0
COUGH: 1

## 2020-03-03 NOTE — ED PROVIDER NOTES
and pressure, postnasal drainage, congestion, ear pain and pressure, cough)    GUAIFENESIN (MUCINEX) 600 MG EXTENDED RELEASE TABLET    Take 1,200 mg by mouth 2 times daily    PSEUDOEPH-DOXYLAMINE-DM-APAP (NYQUIL PO)    Take by mouth    PSEUDOEPHEDRINE-APAP-DM (DAYQUIL PO)    Take by mouth    SERTRALINE (ZOLOFT) 100 MG TABLET    Take 1.5 tablets by mouth daily       ALLERGIES     Patient is is allergic to aleve [naproxen sodium]; crestor [rosuvastatin calcium]; prozac [fluoxetine hcl]; and flagyl [metronidazole]. Patients   Immunization History   Administered Date(s) Administered    FLUARIX 3 YEARS AND OVER 10/28/2015       FAMILY HISTORY     Patient's family history includes Cancer in her father and mother; Heart Disease in her father; High Blood Pressure in her mother; Other in her mother. SOCIAL HISTORY     Patient  reports that she has never smoked. She has never used smokeless tobacco. She reports current alcohol use. She reports that she does not use drugs. PHYSICAL EXAM     ED TRIAGE VITALS  BP: (!) 134/90, Temp: 98.3 °F (36.8 °C), Pulse: 84, Resp: 16, SpO2: 96 %,Estimated body mass index is 30.9 kg/m² as calculated from the following:    Height as of this encounter: 5' 4\" (1.626 m). Weight as of this encounter: 180 lb (81.6 kg). ,No LMP recorded. Patient is postmenopausal.    Physical Exam  Vitals signs and nursing note reviewed. Constitutional:       General: She is not in acute distress. Appearance: She is well-developed. She is not diaphoretic. HENT:      Right Ear: Tympanic membrane normal.      Left Ear: Tympanic membrane normal.      Mouth/Throat: Tonsils: No tonsillar exudate. Swellin on the right. 0 on the left. Eyes:      Conjunctiva/sclera:      Right eye: Right conjunctiva is not injected. Left eye: Left conjunctiva is not injected. Pupils: Pupils are equal.   Neck:      Musculoskeletal: Normal range of motion.    Cardiovascular:      Rate and Rhythm: Normal improve or present to the ER if they worsen. She is agreeable to plan as discussed. PATIENT REFERRED TO:  Lakisha Nino MD  [address] [facility]  / [facility] [address]      DISCHARGE MEDICATIONS:  New Prescriptions    ALBUTEROL (PROVENTIL) (2.5 MG/3ML) 0.083% NEBULIZER SOLUTION    Take 3 mLs by nebulization every 4 hours as needed for Wheezing or Shortness of Breath    ALBUTEROL SULFATE  (90 BASE) MCG/ACT INHALER    Inhale 2 puffs into the lungs every 4 hours as needed for Wheezing or Shortness of Breath    AZITHROMYCIN (ZITHROMAX Z-AASHISH) 250 MG TABLET    Take 2 tablets (500 mg) on Day 1, and then take 1 tablet (250 mg) on days 2 through 5.     PREDNISONE (DELTASONE) 20 MG TABLET    Take 2 tablets by mouth daily for 7 days    RESPIRATORY THERAPY SUPPLIES (NEBULIZER/TUBING/MOUTHPIECE) KIT    1 kit by Does not apply route once for 1 dose       Discontinued Medications    CETIRIZINE (ZYRTEC ALLERGY) 10 MG TABLET    Take 1 tablet by mouth daily    PRAVASTATIN (PRAVACHOL) 40 MG TABLET    Take 1 tablet by mouth daily       Current Discharge Medication List          ROSETTA Berg CNP    (Please note that portions of this note were completed with a voice recognition program. Efforts were made to edit the dictations but occasionally words are mis-transcribed.)          ROSETTA Berg CNP  03/03/20 9237

## 2020-03-03 NOTE — ED TRIAGE NOTES
To room with c/o nasal congestion, hoarse voice and cough that started Thursday. Symptoms worsening, chest feel tight and deep cough.

## 2020-08-04 ENCOUNTER — TELEPHONE (OUTPATIENT)
Dept: FAMILY MEDICINE CLINIC | Age: 60
End: 2020-08-04

## 2020-08-04 NOTE — TELEPHONE ENCOUNTER
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 She wanted to know if Dr. Rick Granda would be wanting to put in orders for her to get labs done prior to her visit for her cholesterol and her thyroid.  Please advise  [phone]

## 2020-08-10 ENCOUNTER — TELEPHONE (OUTPATIENT)
Dept: FAMILY MEDICINE CLINIC | Age: 60
End: 2020-08-10

## 2020-08-11 LAB
ALBUMIN SERPL-MCNC: 4.4 G/DL
ALP BLD-CCNC: 108 U/L
ALT SERPL-CCNC: 29 U/L
ANION GAP SERPL CALCULATED.3IONS-SCNC: 8 MMOL/L
AST SERPL-CCNC: 24 U/L
BASOPHILS ABSOLUTE: 0 /ΜL
BASOPHILS RELATIVE PERCENT: 0.6 %
BILIRUB SERPL-MCNC: 0.6 MG/DL (ref 0.1–1.4)
BUN BLDV-MCNC: 15 MG/DL
CALCIUM SERPL-MCNC: 9.2 MG/DL
CHLORIDE BLD-SCNC: 105 MMOL/L
CHOLESTEROL, TOTAL: 363 MG/DL
CHOLESTEROL/HDL RATIO: ABNORMAL
CO2: 28 MMOL/L
CREAT SERPL-MCNC: 0.88 MG/DL
EOSINOPHILS ABSOLUTE: 0.2 /ΜL
EOSINOPHILS RELATIVE PERCENT: 2.8 %
GFR CALCULATED: NORMAL
GLUCOSE BLD-MCNC: 91 MG/DL
HCT VFR BLD CALC: 41.6 % (ref 36–46)
HDLC SERPL-MCNC: 49 MG/DL (ref 35–70)
HEMOGLOBIN: 14.3 G/DL (ref 12–16)
LDL CHOLESTEROL CALCULATED: 267 MG/DL (ref 0–160)
LYMPHOCYTES ABSOLUTE: 2.7 /ΜL
LYMPHOCYTES RELATIVE PERCENT: 47.8 %
MCH RBC QN AUTO: 32.5 PG
MCHC RBC AUTO-ENTMCNC: 34.3 G/DL
MCV RBC AUTO: 95 FL
MONOCYTES ABSOLUTE: 0.5 /ΜL
MONOCYTES RELATIVE PERCENT: 8.2 %
NEUTROPHILS ABSOLUTE: 2.3 /ΜL
NEUTROPHILS RELATIVE PERCENT: 40.6 %
NONHDLC SERPL-MCNC: ABNORMAL MG/DL
PDW BLD-RTO: 13.4 %
PLATELET # BLD: 230 K/ΜL
PMV BLD AUTO: 9.5 FL
POTASSIUM SERPL-SCNC: 4.1 MMOL/L
RBC # BLD: 4.39 10^6/ΜL
SODIUM BLD-SCNC: 141 MMOL/L
TOTAL PROTEIN: 7.3
TRIGL SERPL-MCNC: 234 MG/DL
TSH SERPL DL<=0.05 MIU/L-ACNC: 0.26 UIU/ML
VLDLC SERPL CALC-MCNC: ABNORMAL MG/DL
WBC # BLD: 5.7 10^3/ML

## 2020-08-12 ENCOUNTER — OFFICE VISIT (OUTPATIENT)
Dept: FAMILY MEDICINE CLINIC | Age: 60
End: 2020-08-12
Payer: COMMERCIAL

## 2020-08-12 VITALS
TEMPERATURE: 97.5 F | BODY MASS INDEX: 30.83 KG/M2 | HEIGHT: 64 IN | SYSTOLIC BLOOD PRESSURE: 136 MMHG | HEART RATE: 66 BPM | WEIGHT: 180.6 LBS | DIASTOLIC BLOOD PRESSURE: 80 MMHG

## 2020-08-12 PROCEDURE — 99401 PREV MED CNSL INDIV APPRX 15: CPT | Performed by: FAMILY MEDICINE

## 2020-08-12 PROCEDURE — G8427 DOCREV CUR MEDS BY ELIG CLIN: HCPCS | Performed by: FAMILY MEDICINE

## 2020-08-12 PROCEDURE — G8417 CALC BMI ABV UP PARAM F/U: HCPCS | Performed by: FAMILY MEDICINE

## 2020-08-12 PROCEDURE — 3017F COLORECTAL CA SCREEN DOC REV: CPT | Performed by: FAMILY MEDICINE

## 2020-08-12 PROCEDURE — 99213 OFFICE O/P EST LOW 20 MIN: CPT | Performed by: FAMILY MEDICINE

## 2020-08-12 PROCEDURE — 1036F TOBACCO NON-USER: CPT | Performed by: FAMILY MEDICINE

## 2020-08-12 RX ORDER — ATORVASTATIN CALCIUM 20 MG/1
20 TABLET, FILM COATED ORAL DAILY
Qty: 30 TABLET | Refills: 5 | Status: SHIPPED | OUTPATIENT
Start: 2020-08-12 | End: 2021-04-16

## 2020-08-12 ASSESSMENT — ENCOUNTER SYMPTOMS: SHORTNESS OF BREATH: 0

## 2020-08-12 NOTE — PATIENT INSTRUCTIONS
If you do chocolate, do the highest dark chocolate you can stand (70%+), non-alkali treatment, lowest sugar possible. To reduce sugar intake in your diet keep this information in mind in planning your day. Maximum sugar intake per day:  6-7 tsp of sugar per day in all products consumed. Since 4 grams of sugar equals 1 tsp, then 24-28 grams of sugar are allowed per day. You will need to read labels of foods bought. In general, keeping fruit intake to 2 cups of fresh fruits is okay. Smart Snacking! When hunger strikes, be ready to strike back with a snack attack! Before you grab just anything off the shelf, make sure your snack choice is the right one. Try these snack ideas when you have that certain craving! Crunchy:  Vegetable Sticks (carrot, celery, cucumber, bell pepper, zucchini)  Broccoli or Cauliflower Muse  Fruit Slices (apples, pears)  Unsalted Rice Cakes  Unsalted Popcorn    Sweet:  Fresh Fruits (talia, apple, banana, frozen grapes, pineapple)  Palm full of Dried Fruits (no sugar added)  Unsweetened Canned Fruit  Plain Yogurt or Cottage Cheese with Fruit  An ounce of Dark Chocolate    Salty:  1/4 Cup [employer] Seeds  One [place] full of Lightly Salted Nuts  Palm full of Olives  Hummus with Veggie Sticks  1 Ounce Low Fat Cheese  Unbuttered Popcorn (try making your own seasoning blend)    Thirsty:  Water! Club Soda with Fresh [place] and [employer] or [place] with Half 100% Fruit or Veggie Juice  Unsweetened Green Tea    Ask your snack these questions to be sure it has your best interests at heart:    Is it... Rosmery Simpler Rosmery Simpler . baked? ... made with whole grains? ... low sodium? ... reduced fat? ... just one serving? Good snacks say: YES! Does it have. .. Rosmery Simpler .. no sugar added? ... no added salt? ... zero trans fat? ... about 100 calories? ... less than 10 grams of sugar? ... more than 5 grams of fiber? ... less than 1 gram of saturated fat?     Good snacks say: YES!    Was it. .. [place] .. fried? ... \"flavor blasted\"? ... stuffed or loaded? ... covered in caramel? ... dipped in chocolate? Good snacks say: NO!             Copied from [email or web address]  On 9/4/0195

## 2020-08-12 NOTE — PROGRESS NOTES
[address] [place] [address] [address], [address], [place]  Phone:  [phone]  AUW:[phone]       Name: Duncan Vergara  : 1960    Chief Complaint   Patient presents with    6 Month Follow-Up     discuss cholesterol medication    Hyperlipidemia       HPI:     Duncan Vergara is a 63 y.o. female who presents today for     HPI    Familial/Mixed hyperlipidemia -- tried twice crestor with bladder issues. Youngest daughter a nurse. Improved her diet but moving so less healthy. Activity levels are less with pandemic. Backed off fried food. Lost of vegetables. Using more fish  Stress snacker, so   H/o thyroid issues. Current Outpatient Medications:     Loratadine-Pseudoephedrine (CLARITIN-D 24 HOUR PO), Take by mouth as needed, Disp: , Rfl:     atorvastatin (LIPITOR) 20 MG tablet, Take 1 tablet by mouth daily, Disp: 30 tablet, Rfl: 5    acetaminophen (TYLENOL) 325 MG tablet, Take 650 mg by mouth every 6 hours as needed for Pain, Disp: , Rfl:     sertraline (ZOLOFT) 100 MG tablet, Take 1.5 tablets by mouth daily, Disp: 135 tablet, Rfl: 3    Allergies   Allergen Reactions    Aleve [Naproxen Sodium] Other (See Comments)     reflux    Crestor [Rosuvastatin Calcium]      Cystitis      Prozac [Fluoxetine Hcl] Nausea Only    Flagyl [Metronidazole] Nausea And Vomiting       Review of Systems   Constitutional: Negative for fatigue and fever. Respiratory: Negative for shortness of breath. Cardiovascular: Negative for chest pain and leg swelling. Objective:     /80 (Site: Left Upper Arm, Position: Sitting, Cuff Size: Medium Adult)   Pulse 66   Temp 97.5 °F (36.4 °C)   Ht 5' 4\" (1.626 m)   Wt 180 lb 9.6 oz (81.9 kg)   BMI 31.00 kg/m²     Physical Exam  Constitutional:       General: She is not in acute distress. Appearance: Normal appearance. She is not ill-appearing. Cardiovascular:      Rate and Rhythm: Normal rate and regular rhythm. Heart sounds:  No murmur. Pulmonary:      Effort: Pulmonary effort is normal. No respiratory distress. Breath sounds: Normal breath sounds. No wheezing. Musculoskeletal:         General: No swelling. Neurological:      Mental Status: She is alert. Psychiatric:         Mood and Affect: Mood normal.       Lab Results   Component Value Date    TSH 0.14 (L) 12/14/2019       Lab Results   Component Value Date    CHOL 313 (H) 12/14/2019    CHOL 339 (H) 06/13/2019    CHOL 371 (H) 10/21/2017     Lab Results   Component Value Date    TRIG 234 (H) 12/14/2019    TRIG 191 (H) 06/13/2019    TRIG 190 (H) 10/21/2017     Lab Results   Component Value Date    HDL 53 12/14/2019    HDL 54 06/13/2019    HDL 62 (H) 10/21/2017     Lab Results   Component Value Date    LDLCALC 213 (H) 12/14/2019    LDLCALC 247 (H) 06/13/2019    LDLCALC 271 (H) 10/21/2017     Lab Results   Component Value Date    LABVLDL 47 (H) 12/14/2019    LABVLDL 38 (H) 06/13/2019    LABVLDL 38 (H) 10/21/2017     Lab Results   Component Value Date    CHOLHDLRATIO 5.9 (H) 12/14/2019    CHOLHDLRATIO 6.3 (H) 06/13/2019    CHOLHDLRATIO 6.0 (H) 10/21/2017     The 10-year ASCVD risk score (Rosalinda Rosales, et al., 2013) is: 5.8%    Values used to calculate the score:      Age: 63 years      Sex: Female      Is Non- : No      Diabetic: No      Tobacco smoker: No      Systolic Blood Pressure: 877 mmHg      Is BP treated: No      HDL Cholesterol: 53 mg/dL      Total Cholesterol: 313 mg/dL    Wt Readings from Last 3 Encounters:   08/12/20 180 lb 9.6 oz (81.9 kg)   03/03/20 180 lb (81.6 kg)   12/20/19 183 lb (83 kg)         Assessment/Plan:     Jani Underwood was seen today for 6 month follow-up and hyperlipidemia. Diagnoses and all orders for this visit:    Familial hypercholesteremia  -     POCT Fecal Immunochemical Test (FIT); Future  -     Lipid Panel; Future  -     TSH without Reflex; Future  -     Hepatic Function Panel;  Future  -     atorvastatin (LIPITOR) 20 MG tablet; Take 1 tablet by mouth daily    Mixed hyperlipidemia  -     POCT Fecal Immunochemical Test (FIT); Future  -     Lipid Panel; Future  -     TSH without Reflex; Future  -     Hepatic Function Panel; Future  -     atorvastatin (LIPITOR) 20 MG tablet; Take 1 tablet by mouth daily    Hypothyroidism due to acquired atrophy of thyroid---poat hyper treatment  -     POCT Fecal Immunochemical Test (FIT); Future  -     Lipid Panel; Future  -     TSH without Reflex; Future  -     Hepatic Function Panel; Future  -     atorvastatin (LIPITOR) 20 MG tablet; Take 1 tablet by mouth daily     will try lipitor -- if SE returns then likely class effect  Discussed other options that are not as potent and have less evidence of prevention of CVD/CVE    Aggressive diet modifications discussed. dietary counseling given in detail for her condition. Addressed barriers of change. Currently also managing a lot of stressful things. Eating to manage stress also discussed. 15 minutes in addition to managing above of dietary counseling given. Return in about 3 months (around 11/12/2020) for lipid follow up with labs. No future appointments.      Electronically signed by Renae Mims MD on 8/12/2020 at 11:39 AM

## 2020-08-18 ENCOUNTER — TELEPHONE (OUTPATIENT)
Dept: FAMILY MEDICINE CLINIC | Age: 60
End: 2020-08-18

## 2020-08-18 NOTE — TELEPHONE ENCOUNTER
Recommend she take Zyrtec 10 mg am and pm,   Then use Benadryl 25 mg OTC every 6 hours as tolerated.

## 2020-08-18 NOTE — TELEPHONE ENCOUNTER
Hcristy has a out break of hives x 4 days, she needs some relief please , can we send something in for her.  She uses [facility] on [facility] [address] [place], [place]

## 2020-11-09 ENCOUNTER — PATIENT MESSAGE (OUTPATIENT)
Dept: FAMILY MEDICINE CLINIC | Age: 60
End: 2020-11-09

## 2020-11-09 NOTE — TELEPHONE ENCOUNTER
From: Marcus Pizarro  To: Abe Mortimer, MD  Sent: 11/9/2020 4:31 PM EST  Subject: Non-Urgent Medical Question    Thank you so much! !      ----- Message -----   From:JERRI Cruzjaron Evans   Sent:11/9/2020 4:29 PM EST   To:Nel Pizarro   Subject:RE: Non-Urgent Medical Question    Hi Ania [place],   I can fax the orders to [facility] for you to have done at your convenience. If you need anything further, please let us know. Ciarra      ----- Message -----   From:Nel Pizarro   Sent:11/9/2020 4:20 PM EST   To:Shirley Olivo MD   Subject:Non-Urgent Medical Question    I had to cancel my appointment for Nov. 99TO due to a conflict with work. I rescheduled for Nov. 30th at 4:20 p.m. I was able to view the blood work that Dr. Kenia Weinstein wants completed, but I use [facility] on [facility]. in [facility] and they do not use the orders online. Is there a way that you can send the orders to them directly? Am I able to print the orders off [employer] and take them in with me? Any assistance with this would be greatly appreciated.     Thanks so much,  [employer]

## 2020-11-09 NOTE — TELEPHONE ENCOUNTER
From: Dessie Crigler Riffle  To: Bruce Sever, MD  Sent: 11/9/2020 4:20 PM EST  Subject: Non-Urgent Medical Question    I had to cancel my appointment for Nov. 52NJ due to a conflict with work. I rescheduled for Nov. 30th at 4:20 p.m. I was able to view the blood work that Dr. Kae Lee wants completed, but I use [facility] on [facility]. in [facility] [address] [address] and they do not use the orders online. Is there a way that you can send the orders to them directly? Am I able to print the orders off [employer] and take them in with me? Any assistance with this would be greatly appreciated.     Thanks so much,  [employer]

## 2021-02-16 NOTE — TELEPHONE ENCOUNTER
Please call patient to arrange follow up appt before refills given recently run out. She also needs to get labs done. She cancelled November visits due to illness/COVID. Mychart note sent to her but not read. \"A refill of 90 days has been provided. We were hoping to coordinate refills at your last visit. 90 days will be sufficient time to plan your next visit and obtain lab work needed. We'll arrange further refills at the next visit. \"    Thank you.

## 2021-02-19 NOTE — TELEPHONE ENCOUNTER
Contact patient and she would like to wait for a follow up until after her next covid shot on 03/04/21. 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  She wants her labs to be faxed to [facility] so I will take care of that so they are ready for her

## 2021-04-14 LAB
ALBUMIN SERPL-MCNC: 4.3 G/DL (ref 3.2–5.3)
ALK PHOSPHATASE: 108 U/L (ref 39–130)
ALT SERPL-CCNC: 22 U/L (ref 0–31)
AST SERPL-CCNC: 19 U/L (ref 0–41)
BILIRUB SERPL-MCNC: 0.4 MG/DL (ref 0.3–1.2)
BILIRUBIN DIRECT: 0 MG/DL (ref 0–0.4)
CHOLESTEROL/HDL RATIO: 6.5 (ref 1–5)
CHOLESTEROL: 340 MG/DL (ref 150–200)
HDLC SERPL-MCNC: 52 MG/DL
LDL CHOLESTEROL CALCULATED: 258 MG/DL
LDL/HDL RATIO: 5
TOTAL PROTEIN: 7.2 G/DL (ref 6–8)
TRIGL SERPL-MCNC: 152 MG/DL (ref 27–150)
TSH SERPL DL<=0.05 MIU/L-ACNC: 0.23 UIU/ML (ref 0.49–4.67)
VLDLC SERPL CALC-MCNC: 30 MG/DL (ref 0–30)

## 2021-04-16 ENCOUNTER — OFFICE VISIT (OUTPATIENT)
Dept: FAMILY MEDICINE CLINIC | Age: 61
End: 2021-04-16
Payer: COMMERCIAL

## 2021-04-16 VITALS
HEART RATE: 74 BPM | TEMPERATURE: 97.6 F | OXYGEN SATURATION: 98 % | SYSTOLIC BLOOD PRESSURE: 162 MMHG | BODY MASS INDEX: 31.58 KG/M2 | DIASTOLIC BLOOD PRESSURE: 90 MMHG | WEIGHT: 184 LBS

## 2021-04-16 DIAGNOSIS — Z13.6 ENCOUNTER FOR SCREENING FOR CORONARY ARTERY DISEASE: ICD-10-CM

## 2021-04-16 DIAGNOSIS — F42.2 MIXED OBSESSIONAL THOUGHTS AND ACTS: ICD-10-CM

## 2021-04-16 DIAGNOSIS — R03.0 ELEVATED BLOOD PRESSURE READING: ICD-10-CM

## 2021-04-16 DIAGNOSIS — E78.2 MIXED HYPERLIPIDEMIA: Primary | ICD-10-CM

## 2021-04-16 DIAGNOSIS — E78.01 FAMILIAL HYPERCHOLESTEREMIA: ICD-10-CM

## 2021-04-16 DIAGNOSIS — E03.4 HYPOTHYROIDISM DUE TO ACQUIRED ATROPHY OF THYROID: Chronic | ICD-10-CM

## 2021-04-16 LAB
CONTROL: PRESENT
HEMOCCULT STL QL: NEGATIVE

## 2021-04-16 PROCEDURE — G8427 DOCREV CUR MEDS BY ELIG CLIN: HCPCS | Performed by: FAMILY MEDICINE

## 2021-04-16 PROCEDURE — 99214 OFFICE O/P EST MOD 30 MIN: CPT | Performed by: FAMILY MEDICINE

## 2021-04-16 PROCEDURE — 82274 ASSAY TEST FOR BLOOD FECAL: CPT | Performed by: FAMILY MEDICINE

## 2021-04-16 PROCEDURE — 3017F COLORECTAL CA SCREEN DOC REV: CPT | Performed by: FAMILY MEDICINE

## 2021-04-16 PROCEDURE — 1036F TOBACCO NON-USER: CPT | Performed by: FAMILY MEDICINE

## 2021-04-16 PROCEDURE — G8417 CALC BMI ABV UP PARAM F/U: HCPCS | Performed by: FAMILY MEDICINE

## 2021-04-16 RX ORDER — SERTRALINE HYDROCHLORIDE 100 MG/1
150 TABLET, FILM COATED ORAL DAILY
Qty: 135 TABLET | Refills: 3 | Status: SHIPPED | OUTPATIENT
Start: 2021-04-16 | End: 2022-05-09 | Stop reason: SDUPTHER

## 2021-04-16 RX ORDER — EZETIMIBE 10 MG/1
10 TABLET ORAL DAILY
Qty: 30 TABLET | Refills: 5 | Status: SHIPPED | OUTPATIENT
Start: 2021-04-16 | End: 2021-10-14 | Stop reason: SDUPTHER

## 2021-04-16 ASSESSMENT — PATIENT HEALTH QUESTIONNAIRE - PHQ9
SUM OF ALL RESPONSES TO PHQ QUESTIONS 1-9: 0
SUM OF ALL RESPONSES TO PHQ9 QUESTIONS 1 & 2: 0
SUM OF ALL RESPONSES TO PHQ QUESTIONS 1-9: 0
1. LITTLE INTEREST OR PLEASURE IN DOING THINGS: 0

## 2021-04-16 ASSESSMENT — ENCOUNTER SYMPTOMS: SHORTNESS OF BREATH: 0

## 2021-04-16 NOTE — PATIENT INSTRUCTIONS
Staff -- please repeat blood pressure for this patient before patient leaves the office. If blood pressure not <140/90, then please arrange follow up in 1 month with nurse visit.    Thank you

## 2021-05-17 ENCOUNTER — TELEPHONE (OUTPATIENT)
Dept: FAMILY MEDICINE CLINIC | Age: 61
End: 2021-05-17

## 2021-05-17 NOTE — TELEPHONE ENCOUNTER
----- Message from Rodolfo Gaston sent at 5/17/2021 11:55 AM EDT -----  Subject: Message to Provider    QUESTIONS  Information for Provider? Pt wants to come in at 3:30pm on 5/24 for a bp   check with the nurse. Please schedule.   ---------------------------------------------------------------------------  --------------  CALL BACK INFO  What is the best way for the office to contact you? OK to leave message on   voicemail  Preferred Call Back Phone Number? [phone]  ---------------------------------------------------------------------------  --------------  SCRIPT ANSWERS  Relationship to Patient?  Self

## 2021-05-24 ENCOUNTER — NURSE ONLY (OUTPATIENT)
Dept: FAMILY MEDICINE CLINIC | Age: 61
End: 2021-05-24

## 2021-05-24 VITALS — HEART RATE: 72 BPM | DIASTOLIC BLOOD PRESSURE: 78 MMHG | SYSTOLIC BLOOD PRESSURE: 138 MMHG

## 2021-05-24 DIAGNOSIS — R03.0 ELEVATED BLOOD PRESSURE READING: Primary | ICD-10-CM

## 2021-05-24 PROCEDURE — 99999 PR OFFICE/OUTPT VISIT,PROCEDURE ONLY: CPT | Performed by: FAMILY MEDICINE

## 2021-07-15 ENCOUNTER — HOSPITAL ENCOUNTER (EMERGENCY)
Age: 61
Discharge: HOME | End: 2021-07-15
Payer: COMMERCIAL

## 2021-07-15 VITALS
DIASTOLIC BLOOD PRESSURE: 82 MMHG | HEART RATE: 65 BPM | TEMPERATURE: 98.9 F | RESPIRATION RATE: 16 BRPM | OXYGEN SATURATION: 96 % | SYSTOLIC BLOOD PRESSURE: 165 MMHG

## 2021-07-15 DIAGNOSIS — J06.9 UPPER RESPIRATORY TRACT INFECTION, UNSPECIFIED TYPE: Primary | ICD-10-CM

## 2021-07-15 PROCEDURE — 99213 OFFICE O/P EST LOW 20 MIN: CPT | Performed by: NURSE PRACTITIONER

## 2021-07-15 PROCEDURE — 99213 OFFICE O/P EST LOW 20 MIN: CPT

## 2021-07-15 RX ORDER — AMOXICILLIN AND CLAVULANATE POTASSIUM 875; 125 MG/1; MG/1
1 TABLET, FILM COATED ORAL 2 TIMES DAILY
Qty: 14 TABLET | Refills: 0 | Status: SHIPPED | OUTPATIENT
Start: 2021-07-15 | End: 2021-07-22

## 2021-07-15 RX ORDER — PREDNISONE 20 MG/1
40 TABLET ORAL DAILY
Qty: 14 TABLET | Refills: 0 | Status: SHIPPED | OUTPATIENT
Start: 2021-07-15 | End: 2021-07-22

## 2021-07-15 RX ORDER — ALBUTEROL SULFATE 2.5 MG/3ML
2.5 SOLUTION RESPIRATORY (INHALATION) EVERY 6 HOURS PRN
COMMUNITY

## 2021-07-15 ASSESSMENT — ENCOUNTER SYMPTOMS
NAUSEA: 0
SINUS PRESSURE: 1
SORE THROAT: 0
SHORTNESS OF BREATH: 0
VOMITING: 0
DIARRHEA: 0
WHEEZING: 1
COUGH: 1

## 2021-07-15 ASSESSMENT — PAIN DESCRIPTION - DESCRIPTORS: DESCRIPTORS: TIGHTNESS

## 2021-07-15 ASSESSMENT — PAIN SCALES - GENERAL: PAINLEVEL_OUTOF10: 5

## 2021-07-15 ASSESSMENT — PAIN DESCRIPTION - PAIN TYPE: TYPE: ACUTE PAIN

## 2021-07-15 ASSESSMENT — PAIN DESCRIPTION - LOCATION: LOCATION: CHEST

## 2021-07-15 ASSESSMENT — PAIN DESCRIPTION - ORIENTATION: ORIENTATION: LEFT;RIGHT

## 2021-07-15 ASSESSMENT — PAIN DESCRIPTION - FREQUENCY: FREQUENCY: CONTINUOUS

## 2021-07-15 NOTE — ED NOTES
Patient discharge instructions given to pt and pt verbalized understanding,  2 px given, no other needs at this time, and pt left in stable condition.      Isabel Nicole RN  07/15/21 [id number]

## 2021-07-15 NOTE — ED PROVIDER NOTES
[facility]  Urgent Care Encounter       CHIEF COMPLAINT       Chief Complaint   Patient presents with    Cough     onset , productive light yellow     Otalgia     bilateral ear pain onset a day ago        Nurses Notes reviewed and I agree except as noted in the HPI. HISTORY OF PRESENT ILLNESS   Shakir Melo is a 57 y.o. female who presents for evaluation of cough, congestion, sinus pressure, bilateral ear pressure and wheezing. Patient states that the symptoms have been progressing over the past 5 to 6 days. She denies any fever. She does report a history of seasonal allergies and typically takes Claritin. States that she has had bronchitis in the past and has been using albuterol breathing treatments at home which do provide some relief. States that she is also been taking Robitussin which provides mild relief of the cough as well. Patient states that she is fully vaccinated for Covid. The history is provided by the patient. REVIEW OF SYSTEMS     Review of Systems   Constitutional: Negative for chills and fever. HENT: Positive for congestion, ear pain and sinus pressure. Negative for ear discharge and sore throat. Respiratory: Positive for cough and wheezing. Negative for shortness of breath. Cardiovascular: Negative for chest pain. Gastrointestinal: Negative for diarrhea, nausea and vomiting. Musculoskeletal: Negative for arthralgias and myalgias. Skin: Negative for rash. Allergic/Immunologic: Positive for environmental allergies. Neurological: Negative for headaches.        PAST MEDICAL HISTORY         Diagnosis Date    Allergic rhinitis     GERD (gastroesophageal reflux disease)     Hyperlipidemia     Hyperthyroidism     IBS (irritable bowel syndrome)     OCD (obsessive compulsive disorder)     Thyroid nodule        SURGICALHISTORY     Patient  has a past surgical history that includes  section; LEEP; hernia repair; and Ovary removal (Right). CURRENT MEDICATIONS       Previous Medications    ALBUTEROL (PROVENTIL) (2.5 MG/3ML) 0.083% NEBULIZER SOLUTION    Take 2.5 mg by nebulization every 6 hours as needed for Wheezing    EZETIMIBE (ZETIA) 10 MG TABLET    Take 1 tablet by mouth daily    LORATADINE-PSEUDOEPHEDRINE (CLARITIN-D 24 HOUR PO)    Take by mouth as needed    SERTRALINE (ZOLOFT) 100 MG TABLET    Take 1.5 tablets by mouth daily       ALLERGIES     Patient is is allergic to aleve [naproxen sodium], crestor [rosuvastatin calcium], lipitor [atorvastatin], prozac [fluoxetine hcl], and flagyl [metronidazole]. Patients   Immunization History   Administered Date(s) Administered    FLUARIX 3 YEARS AND OVER 10/28/2015       FAMILY HISTORY     Patient's family history includes Cancer in her father and mother; Heart Disease in her father; High Blood Pressure in her mother; Other in her mother. SOCIAL HISTORY     Patient  reports that she has never smoked. She has never used smokeless tobacco. She reports current alcohol use. She reports that she does not use drugs. PHYSICAL EXAM     ED TRIAGE VITALS  BP: (!) 165/82, Temp: 98.9 °F (37.2 °C), Pulse: 65, Resp: 16, SpO2: 96 %,Estimated body mass index is 31.58 kg/m² as calculated from the following:    Height as of 8/12/20: 5' 4\" (1.626 m). Weight as of 4/16/21: 184 lb (83.5 kg). ,No LMP recorded. Patient is postmenopausal.    Physical Exam  Vitals and nursing note reviewed. Constitutional:       General: She is not in acute distress. Appearance: She is well-developed. She is not diaphoretic. HENT:      Right Ear: Tympanic membrane is bulging. Tympanic membrane is not erythematous. Left Ear: Tympanic membrane is bulging. Tympanic membrane is not erythematous. Nose:      Right Sinus: Maxillary sinus tenderness present. No frontal sinus tenderness. Left Sinus: Maxillary sinus tenderness present. No frontal sinus tenderness.       Mouth/Throat:      Mouth: Mucous membranes are moist.      Pharynx: Oropharynx is clear. Eyes:      Conjunctiva/sclera:      Right eye: Right conjunctiva is not injected. Left eye: Left conjunctiva is not injected. Pupils: Pupils are equal.   Cardiovascular:      Rate and Rhythm: Normal rate and regular rhythm. Heart sounds: No murmur heard. Pulmonary:      Effort: Pulmonary effort is normal. No respiratory distress. Breath sounds: Examination of the right-upper field reveals wheezing. Examination of the left-upper field reveals wheezing. Wheezing present. Musculoskeletal:      Cervical back: Normal range of motion. Right knee: Normal range of motion. Left knee: Normal range of motion. Lymphadenopathy:      Head:      Right side of head: No tonsillar adenopathy. Left side of head: No tonsillar adenopathy. Cervical: No cervical adenopathy. Skin:     General: Skin is warm. Findings: No rash. Neurological:      Mental Status: She is alert and oriented to person, place, and time. Psychiatric:         Behavior: Behavior normal.         DIAGNOSTIC RESULTS     Labs:No results found for this visit on 07/15/21. IMAGING:    No orders to display         EKG:  none    URGENT CARE COURSE:     Vitals:    07/15/21 1716   BP: (!) 165/82   Pulse: 65   Resp: 16   Temp: 98.9 °F (37.2 °C)   TempSrc: Temporal   SpO2: 96%       Medications - No data to display         PROCEDURES:  None    FINAL IMPRESSION      1. Upper respiratory tract infection, unspecified type          DISPOSITION/ PLAN     Exam is consistent with an upper respiratory infection at this time consisting of a sinus infection and bronchitis. I discussed with the patient plan to treat with oral Augmentin as well as prednisone and she is advised to continue Robitussin and albuterol at home. She is agreeable to plan as discussed and will follow-up on an outpatient basis as needed.       PATIENT REFERRED TO:  Diane Ragsdale MD  [address] [facility] / [place] [address]      DISCHARGE MEDICATIONS:  New Prescriptions    AMOXICILLIN-CLAVULANATE (AUGMENTIN) 875-125 MG PER TABLET    Take 1 tablet by mouth 2 times daily for 7 days    PREDNISONE (DELTASONE) 20 MG TABLET    Take 2 tablets by mouth daily for 7 days       Discontinued Medications    No medications on file       Current Discharge Medication List          ROSETTA Molina CNP    (Please note that portions of this note were completed with a voice recognition program. Efforts were made to edit the dictations but occasionally words are mis-transcribed.)          ROSETTA Molina CNP  07/15/21 9651

## 2021-10-09 ENCOUNTER — PATIENT MESSAGE (OUTPATIENT)
Dept: FAMILY MEDICINE CLINIC | Age: 61
End: 2021-10-09

## 2021-10-11 NOTE — TELEPHONE ENCOUNTER
From: Ranjeet Pizarro  To: Radha Vora MD  Sent: 10/9/2021 1:11 PM EDT  Subject: Non-Urgent Medical Question    I have an appointment with Dr. Cynthia Harden on Oct. 18th. I am sure that she wants blood work done. I go to [facility] on [address]. for blood work - would you please contact me when you have blood work orders sent?    Thank you so much,  Christy Pizarro

## 2021-10-14 DIAGNOSIS — E78.01 FAMILIAL HYPERCHOLESTEREMIA: ICD-10-CM

## 2021-10-14 DIAGNOSIS — E78.2 MIXED HYPERLIPIDEMIA: ICD-10-CM

## 2021-10-14 RX ORDER — EZETIMIBE 10 MG/1
10 TABLET ORAL DAILY
Qty: 30 TABLET | Refills: 0 | Status: SHIPPED | OUTPATIENT
Start: 2021-10-14 | End: 2021-10-18 | Stop reason: SDUPTHER

## 2021-10-14 NOTE — TELEPHONE ENCOUNTER
Wander Buchanan called requesting a refill on the following medications:  Requested Prescriptions     Pending Prescriptions Disp Refills    ezetimibe (ZETIA) 10 MG tablet 30 tablet 5     Sig: Take 1 tablet by mouth daily       Date of last visit: 4/16/2021  Date of next visit (if applicable):Visit date not found  Date of last refill: 04/16/21  Pharmacy Name: [facility]      Thanks,  Del Melissa LPN

## 2021-10-17 LAB
ALBUMIN SERPL-MCNC: 4.3 G/DL (ref 3.2–5.3)
ALK PHOSPHATASE: 100 U/L (ref 39–130)
ALT SERPL-CCNC: 31 U/L (ref 0–31)
ANION GAP SERPL CALCULATED.3IONS-SCNC: 9 MMOL/L (ref 5–15)
AST SERPL-CCNC: 26 U/L (ref 0–41)
BILIRUB SERPL-MCNC: 0.5 MG/DL (ref 0.3–1.2)
BUN BLDV-MCNC: 16 MG/DL (ref 5–27)
CALCIUM SERPL-MCNC: 9.1 MG/DL (ref 8.5–10.5)
CHLORIDE BLD-SCNC: 104 MMOL/L (ref 98–109)
CHOLESTEROL/HDL RATIO: 4 (ref 1–5)
CHOLESTEROL: 253 MG/DL (ref 150–200)
CO2: 28 MMOL/L (ref 22–32)
CREAT SERPL-MCNC: 0.84 MG/DL (ref 0.4–1)
EGFR AFRICAN AMERICAN: >60 ML/MIN/1.73SQ.M
EGFR IF NONAFRICAN AMERICAN: >60 ML/MIN/1.73SQ.M
GLUCOSE: 80 MG/DL (ref 65–99)
HDLC SERPL-MCNC: 63 MG/DL
LDL CHOLESTEROL CALCULATED: 159 MG/DL
LDL/HDL RATIO: 2.5
POTASSIUM SERPL-SCNC: 4.2 MMOL/L (ref 3.5–5)
SODIUM BLD-SCNC: 141 MMOL/L (ref 134–146)
T4 FREE: 0.81 NG/DL (ref 0.61–1.6)
TOTAL PROTEIN: 7.2 G/DL (ref 6–8)
TRIGL SERPL-MCNC: 156 MG/DL (ref 27–150)
TSH SERPL DL<=0.05 MIU/L-ACNC: 0.24 UIU/ML (ref 0.49–4.67)
VLDLC SERPL CALC-MCNC: 31 MG/DL (ref 0–30)

## 2021-10-18 ENCOUNTER — OFFICE VISIT (OUTPATIENT)
Dept: FAMILY MEDICINE CLINIC | Age: 61
End: 2021-10-18
Payer: COMMERCIAL

## 2021-10-18 VITALS
WEIGHT: 182 LBS | DIASTOLIC BLOOD PRESSURE: 88 MMHG | TEMPERATURE: 96.9 F | SYSTOLIC BLOOD PRESSURE: 132 MMHG | OXYGEN SATURATION: 98 % | BODY MASS INDEX: 31.24 KG/M2 | HEART RATE: 73 BPM

## 2021-10-18 DIAGNOSIS — E03.4 HYPOTHYROIDISM DUE TO ACQUIRED ATROPHY OF THYROID: ICD-10-CM

## 2021-10-18 DIAGNOSIS — E78.01 FAMILIAL HYPERCHOLESTEREMIA: ICD-10-CM

## 2021-10-18 DIAGNOSIS — E78.2 MIXED HYPERLIPIDEMIA: Primary | ICD-10-CM

## 2021-10-18 PROCEDURE — 3017F COLORECTAL CA SCREEN DOC REV: CPT | Performed by: FAMILY MEDICINE

## 2021-10-18 PROCEDURE — 99214 OFFICE O/P EST MOD 30 MIN: CPT | Performed by: FAMILY MEDICINE

## 2021-10-18 PROCEDURE — 1036F TOBACCO NON-USER: CPT | Performed by: FAMILY MEDICINE

## 2021-10-18 PROCEDURE — G8427 DOCREV CUR MEDS BY ELIG CLIN: HCPCS | Performed by: FAMILY MEDICINE

## 2021-10-18 PROCEDURE — G8484 FLU IMMUNIZE NO ADMIN: HCPCS | Performed by: FAMILY MEDICINE

## 2021-10-18 PROCEDURE — G8417 CALC BMI ABV UP PARAM F/U: HCPCS | Performed by: FAMILY MEDICINE

## 2021-10-18 RX ORDER — EZETIMIBE 10 MG/1
10 TABLET ORAL DAILY
Qty: 90 TABLET | Refills: 3 | Status: SHIPPED | OUTPATIENT
Start: 2021-10-18 | End: 2022-05-09 | Stop reason: SDUPTHER

## 2021-10-18 NOTE — PROGRESS NOTES
Kierra Mcpherson (:  1960) is a 57 y.o. female,Established patient, here for evaluation of the following chief complaint(s):  6 Month Follow-Up and Hyperlipidemia         ASSESSMENT/PLAN:  1. Mixed hyperlipidemia  -     ezetimibe (ZETIA) 10 MG tablet; Take 1 tablet by mouth daily, Disp-90 tablet, R-3Normal  -     TSH with Reflex; Future  -     Lipid Panel; Future  2. Familial hypercholesteremia  -     ezetimibe (ZETIA) 10 MG tablet; Take 1 tablet by mouth daily, Disp-90 tablet, R-3Normal  -     TSH with Reflex; Future  -     Lipid Panel; Future  3. Hypothyroidism due to acquired atrophy of thyroid  -     TSH with Reflex; Future  -     Lipid Panel; Future  4. BMI 31.0-31.9,adult    Much improvement noted in cholesterol  Encouraged continued improvement of diet to lower LDL further and maintain HDL high and Triglycerides low. Higher fiber content in foods, using olive oil and avocado and omega-3 fatty acids will help as well. Encouraged healthy activity levels as possible with schedule. Continue zetia    Return in about 6 months (around 2022). Subjective   SUBJECTIVE/OBJECTIVE:  HPI     High cholesterol follow up.   zetia well tolerated. Changed her diet: portions, choosing better foods, including vegetables, fruits. Hypothyroidism stable. Taking medication consistently. 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 Lots going on. Wt Readings from Last 3 Encounters:   10/18/21 182 lb (82.6 kg)   21 184 lb (83.5 kg)   20 180 lb 9.6 oz (81.9 kg)     BP Readings from Last 3 Encounters:   10/18/21 132/88   07/15/21 (!) 165/82   21 138/78     Review of Systems   Constitutional: Negative for fatigue and fever. Respiratory: Negative for shortness of breath. Cardiovascular: Negative for chest pain and leg swelling.           Objective   Physical Exam  Constitutional:       General: She is not in acute distress. Appearance: Normal appearance. She is not ill-appearing. Cardiovascular:      Rate and Rhythm: Normal rate and regular rhythm. Heart sounds: No murmur heard. Pulmonary:      Effort: Pulmonary effort is normal. No respiratory distress. Breath sounds: Normal breath sounds. No wheezing. Musculoskeletal:         General: No swelling. Neurological:      Mental Status: She is alert. Psychiatric:         Mood and Affect: Mood normal.             Orders Only on 10/16/2021   Component Date Value Ref Range Status    Cholesterol 10/16/2021 253* 150 - 200 mg/dL Final    Triglycerides 10/16/2021 156* 27 - 150 mg/dL Final    HDL 10/16/2021 63  >39 mg/dL Final    Comment:       HDL <40 mg/dL - High Risk  HDL > or = 40mg/dL- Desirable  HDL >60 mg/dL - Negative Risk  ---------------------------------------------      LDL Calculated 10/16/2021 159* <130 mg/dL Final    Comment:       LDL    <100 mg/dL - Desirable  LDL    >160 mg/dL - High Risk  ---------------------------------------------      VLDL Cholesterol Calculated 10/16/2021 31* 0 - 30 mg/dL Final    LDl/HDL Ratio 10/16/2021 2.5  <3.5 Final    Chol/HDL Ratio 10/16/2021 4.0  1.0 - 5.0 Final    Comment:             Test performed at [address] [place] [address]. [facility]                  [address] Number [id number]  ---------------------------------------------------------------------      TSH 10/16/2021 0.24* 0.49 - 4.67 uIU/mL Final    Comment: Performed at [address] [place] [address]. [facility]  [address] [facility] [address]      Glucose 10/16/2021 80  65 - 99 mg/dL Final    BUN 10/16/2021 16  5 - 27 mg/dL Final    CREATININE 10/16/2021 0.84  0.40 - 1.00 mg/dL Final    METHOD TRACEABLE TO IDMS STANDARD    eGFR  10/16/2021 >60  >59 ml/min/1.73sq. m Final    EGFR IF NonAfrican American 10/16/2021 >60  >59 ml/min/1.73sq. m Final    Calcium 10/16/2021 9.1  8.5 - 10.5 mg/dL Final    Sodium 10/16/2021 141  134 - 146 mmol/L Final    Potassium 10/16/2021 4.2  3.5 - 5.0 mmol/L Final    Chloride 10/16/2021 104  98 - 109 mmol/L Final    CO2 10/16/2021 28  22 - 32 mmol/L Final    Anion Gap 10/16/2021 9  5 - 15 mmol/L Final    Total Bilirubin 10/16/2021 0.5  0.3 - 1.2 mg/dL Final    Alk Phosphatase 10/16/2021 100  39 - 130 U/L Final    AST 10/16/2021 26  0 - 41 U/L Final    ALT 10/16/2021 31  0 - 31 U/L Final    Total Protein 10/16/2021 7.2  6.0 - 8.0 g/dL Final    Albumin 10/16/2021 4.3  3.2 - 5.3 g/dL Final    Comment:             Test performed at [address] [place] [address] Lab                  [address]                                         CLIA Number [id number]  ---------------------------------------------------------------------      T4 Free 10/16/2021 0.81  0.61 - 1.60 ng/dL Final    Comment: Performed at [address] [place] [address] Lab  [facility] [address]  Pathology [facility], [address] [place] [address]  CLIA No. [id number]   CAP Accreditation No. [id number]  : Jyothi Back. Talia Torres M.D.          Lab Results   Component Value Date    CHOL 253 (H) 10/16/2021    CHOL 340 (H) 04/14/2021    CHOL 363 08/11/2020     Lab Results   Component Value Date    TRIG 156 (H) 10/16/2021    TRIG 152 (H) 04/14/2021    TRIG 234 08/11/2020     Lab Results   Component Value Date    HDL 63 10/16/2021    HDL 52 04/14/2021    HDL 49 08/11/2020     Lab Results   Component Value Date    LDLCALC 159 (H) 10/16/2021    LDLCALC 258 (H) 04/14/2021    LDLCALC 267 (A) 08/11/2020     Lab Results   Component Value Date    LABVLDL 31 (H) 10/16/2021    LABVLDL 30 04/14/2021    LABVLDL 47 (H) 12/14/2019     Lab Results   Component Value Date    CHOLHDLRATIO 4.0 10/16/2021    CHOLHDLRATIO 6.5 (H) 04/14/2021    CHOLHDLRATIO 5.9 (H) 12/14/2019     The 10-year ASCVD risk score (Stanley Escalera et al., 2013) is: 4.7%    Values used to calculate the score:      Age: 57 years      Sex: Female      Is Non- : No      Diabetic: No      Tobacco smoker: No      Systolic Blood Pressure: 285 mmHg      Is BP treated: No      HDL Cholesterol: 63 mg/dL      Total Cholesterol: 253 mg/dL    An electronic signature was used to authenticate this note.     --Juan Farley MD

## 2021-10-30 ASSESSMENT — ENCOUNTER SYMPTOMS: SHORTNESS OF BREATH: 0

## 2022-04-01 ENCOUNTER — TELEPHONE (OUTPATIENT)
Dept: FAMILY MEDICINE CLINIC | Age: 62
End: 2022-04-01

## 2022-04-01 NOTE — TELEPHONE ENCOUNTER
Called and left message for Anna Campos to call our office back to clarify which lab work she is wanting sent.

## 2022-04-01 NOTE — TELEPHONE ENCOUNTER
----- Message from Salome Hernandez sent at 4/1/2022  3:25 PM EDT -----  Subject: Referral Request    QUESTIONS   Reason for referral request? Needs [facility] to [facility] for   5/23 appointment   Has the physician seen you for this condition before? Yes  Select a date? 2021-10-18  Select the Provider the patient wants to be referred to, if known (PCP or   Specialist)? Shirley Diaz   Preferred Specialist (if applicable)? Do you already have an appointment scheduled? No  Additional Information for Provider?   ---------------------------------------------------------------------------  --------------  CALL BACK INFO  What is the best way for the office to contact you? OK to leave message on   voicemail  Preferred Call Back Phone Number? [phone]  ---------------------------------------------------------------------------  --------------  SCRIPT ANSWERS  Relationship to Patient?  Self

## 2022-04-04 NOTE — TELEPHONE ENCOUNTER
Left message for Christy to return call regarding which labs to fax to [facility].

## 2022-04-08 NOTE — TELEPHONE ENCOUNTER
Spoke with patient and she agreed and verbalized understanding that labs were sent to [facility].

## 2022-05-09 DIAGNOSIS — E78.2 MIXED HYPERLIPIDEMIA: ICD-10-CM

## 2022-05-09 DIAGNOSIS — E78.01 FAMILIAL HYPERCHOLESTEREMIA: ICD-10-CM

## 2022-05-09 DIAGNOSIS — F42.2 MIXED OBSESSIONAL THOUGHTS AND ACTS: ICD-10-CM

## 2022-05-09 RX ORDER — SERTRALINE HYDROCHLORIDE 100 MG/1
150 TABLET, FILM COATED ORAL DAILY
Qty: 45 TABLET | Refills: 0 | Status: SHIPPED | OUTPATIENT
Start: 2022-05-09 | End: 2022-05-23 | Stop reason: SDUPTHER

## 2022-05-09 RX ORDER — EZETIMIBE 10 MG/1
10 TABLET ORAL DAILY
Qty: 30 TABLET | Refills: 0 | Status: SHIPPED | OUTPATIENT
Start: 2022-05-09 | End: 2022-05-23 | Stop reason: SDUPTHER

## 2022-05-09 NOTE — TELEPHONE ENCOUNTER
Jack Bowen called requesting a refill on the following medications:  Requested Prescriptions     Pending Prescriptions Disp Refills    sertraline (ZOLOFT) 100 MG tablet 135 tablet 3     Sig: Take 1.5 tablets by mouth daily    ezetimibe (ZETIA) 10 MG tablet 90 tablet 3     Sig: Take 1 tablet by mouth daily       Date of last visit: 10/18/2021  Date of next visit (if applicable):Visit date not found  Date of last refill: 04/18/21  Pharmacy Name: [facility]      Thanks,  Appointment scheduled

## 2022-05-15 LAB
ALBUMIN SERPL-MCNC: 4.2 G/DL (ref 3.2–5.3)
ALK PHOSPHATASE: 118 U/L (ref 39–130)
ALT SERPL-CCNC: 25 U/L (ref 0–31)
ANION GAP SERPL CALCULATED.3IONS-SCNC: 10 MMOL/L (ref 5–15)
AST SERPL-CCNC: 20 U/L (ref 0–41)
BILIRUB SERPL-MCNC: 0.5 MG/DL (ref 0.3–1.2)
BUN BLDV-MCNC: 16 MG/DL (ref 5–27)
CALCIUM SERPL-MCNC: 8.9 MG/DL (ref 8.5–10.5)
CHLORIDE BLD-SCNC: 106 MMOL/L (ref 98–109)
CHOLESTEROL/HDL RATIO: 4.9 (ref 1–5)
CHOLESTEROL: 260 MG/DL (ref 150–200)
CO2: 26 MMOL/L (ref 22–32)
CREAT SERPL-MCNC: 0.83 MG/DL (ref 0.4–1)
EGFR AFRICAN AMERICAN: >60 ML/MIN/1.73SQ.M
EGFR IF NONAFRICAN AMERICAN: >60 ML/MIN/1.73SQ.M
GLUCOSE: 94 MG/DL (ref 65–99)
HDLC SERPL-MCNC: 53 MG/DL
LDL CHOLESTEROL CALCULATED: 154 MG/DL
LDL/HDL RATIO: 2.9
POTASSIUM SERPL-SCNC: 4 MMOL/L (ref 3.5–5)
SODIUM BLD-SCNC: 142 MMOL/L (ref 134–146)
T4 FREE: 0.94 NG/DL (ref 0.61–1.6)
TOTAL PROTEIN: 6.7 G/DL (ref 6–8)
TRIGL SERPL-MCNC: 267 MG/DL (ref 27–150)
TSH SERPL DL<=0.05 MIU/L-ACNC: 0.24 UIU/ML (ref 0.49–4.67)
VLDLC SERPL CALC-MCNC: 53 MG/DL (ref 0–30)

## 2022-05-16 DIAGNOSIS — E78.2 MIXED HYPERLIPIDEMIA: ICD-10-CM

## 2022-05-16 DIAGNOSIS — E78.01 FAMILIAL HYPERCHOLESTEREMIA: ICD-10-CM

## 2022-05-16 DIAGNOSIS — E03.4 HYPOTHYROIDISM DUE TO ACQUIRED ATROPHY OF THYROID: ICD-10-CM

## 2022-05-23 ENCOUNTER — OFFICE VISIT (OUTPATIENT)
Dept: FAMILY MEDICINE CLINIC | Age: 62
End: 2022-05-23
Payer: COMMERCIAL

## 2022-05-23 VITALS
WEIGHT: 183.6 LBS | TEMPERATURE: 97.1 F | BODY MASS INDEX: 31.34 KG/M2 | HEIGHT: 64 IN | SYSTOLIC BLOOD PRESSURE: 138 MMHG | OXYGEN SATURATION: 97 % | RESPIRATION RATE: 14 BRPM | HEART RATE: 72 BPM | DIASTOLIC BLOOD PRESSURE: 88 MMHG

## 2022-05-23 DIAGNOSIS — E78.2 MIXED HYPERLIPIDEMIA: Primary | ICD-10-CM

## 2022-05-23 DIAGNOSIS — E78.01 FAMILIAL HYPERCHOLESTEREMIA: ICD-10-CM

## 2022-05-23 DIAGNOSIS — E03.4 HYPOTHYROIDISM DUE TO ACQUIRED ATROPHY OF THYROID: Chronic | ICD-10-CM

## 2022-05-23 DIAGNOSIS — F42.2 MIXED OBSESSIONAL THOUGHTS AND ACTS: ICD-10-CM

## 2022-05-23 DIAGNOSIS — J30.2 SEASONAL ALLERGIC RHINITIS, UNSPECIFIED TRIGGER: ICD-10-CM

## 2022-05-23 PROCEDURE — G8417 CALC BMI ABV UP PARAM F/U: HCPCS | Performed by: FAMILY MEDICINE

## 2022-05-23 PROCEDURE — 99214 OFFICE O/P EST MOD 30 MIN: CPT | Performed by: FAMILY MEDICINE

## 2022-05-23 PROCEDURE — 3017F COLORECTAL CA SCREEN DOC REV: CPT | Performed by: FAMILY MEDICINE

## 2022-05-23 PROCEDURE — 1036F TOBACCO NON-USER: CPT | Performed by: FAMILY MEDICINE

## 2022-05-23 PROCEDURE — G8427 DOCREV CUR MEDS BY ELIG CLIN: HCPCS | Performed by: FAMILY MEDICINE

## 2022-05-23 RX ORDER — SERTRALINE HYDROCHLORIDE 100 MG/1
150 TABLET, FILM COATED ORAL DAILY
Qty: 135 TABLET | Refills: 3 | Status: SHIPPED | OUTPATIENT
Start: 2022-05-23

## 2022-05-23 RX ORDER — EZETIMIBE 10 MG/1
10 TABLET ORAL DAILY
Qty: 90 TABLET | Refills: 3 | Status: SHIPPED | OUTPATIENT
Start: 2022-05-23

## 2022-05-23 RX ORDER — LANOLIN ALCOHOL/MO/W.PET/CERES
500 CREAM (GRAM) TOPICAL NIGHTLY
Qty: 90 CAPSULE | Refills: 3 | COMMUNITY
Start: 2022-05-23 | End: 2023-05-23

## 2022-05-23 SDOH — ECONOMIC STABILITY: FOOD INSECURITY: WITHIN THE PAST 12 MONTHS, THE FOOD YOU BOUGHT JUST DIDN'T LAST AND YOU DIDN'T HAVE MONEY TO GET MORE.: NEVER TRUE

## 2022-05-23 SDOH — ECONOMIC STABILITY: FOOD INSECURITY: WITHIN THE PAST 12 MONTHS, YOU WORRIED THAT YOUR FOOD WOULD RUN OUT BEFORE YOU GOT MONEY TO BUY MORE.: NEVER TRUE

## 2022-05-23 ASSESSMENT — PATIENT HEALTH QUESTIONNAIRE - PHQ9
1. LITTLE INTEREST OR PLEASURE IN DOING THINGS: 0
SUM OF ALL RESPONSES TO PHQ QUESTIONS 1-9: 0
SUM OF ALL RESPONSES TO PHQ QUESTIONS 1-9: 0
2. FEELING DOWN, DEPRESSED OR HOPELESS: 0
SUM OF ALL RESPONSES TO PHQ9 QUESTIONS 1 & 2: 0
SUM OF ALL RESPONSES TO PHQ QUESTIONS 1-9: 0
SUM OF ALL RESPONSES TO PHQ QUESTIONS 1-9: 0

## 2022-05-23 ASSESSMENT — ENCOUNTER SYMPTOMS
SHORTNESS OF BREATH: 0
CHEST TIGHTNESS: 0
BLOOD IN STOOL: 0
WHEEZING: 0
VOMITING: 0
NAUSEA: 0

## 2022-05-23 ASSESSMENT — SOCIAL DETERMINANTS OF HEALTH (SDOH): HOW HARD IS IT FOR YOU TO PAY FOR THE VERY BASICS LIKE FOOD, HOUSING, MEDICAL CARE, AND HEATING?: NOT HARD AT ALL

## 2022-05-23 NOTE — PROGRESS NOTES
Aurelio Minaya (:  1960) is a 60 y.o. female,Established patient, here for evaluation of the following chief complaint(s):  6 Month Follow-Up and Hyperlipidemia         ASSESSMENT/PLAN:  1. Mixed hyperlipidemia  -     ezetimibe (ZETIA) 10 MG tablet; Take 1 tablet by mouth daily, Disp-90 tablet, R-3Normal  -     Lipid Panel; Future  -     niacin 500 MG extended release capsule; Take 1 capsule by mouth nightly, Disp-90 capsule, R-3OTC  2. Hypothyroidism due to acquired atrophy of thyroid---poat hyper treatment  3. Familial hypercholesteremia  -     ezetimibe (ZETIA) 10 MG tablet; Take 1 tablet by mouth daily, Disp-90 tablet, R-3Normal  4. Mixed obsessional thoughts and acts  -     sertraline (ZOLOFT) 100 MG tablet; Take 1.5 tablets by mouth daily, Disp-135 tablet, R-3Normal     She would like to more aggressively work on healthier diet  She will try niacin to help with reduction of triglycerides and improve HDL. Dietary counseling given -- reviewed patient's current eating patterns. Discussed benefits (both short-term and long-term) of a healthy eating pattern for patient conditions and prevention of others. Specific recommendations given to patient based on agreed goals for diet change. Continue zetia. Consider adding statin. Encouraged good exercise routine as well. Return in about 6 months (around 2022). Subjective   SUBJECTIVE/OBJECTIVE:  HPI    High cholesterol -- not tolerated lipitor and crestor. She is aware of poorer diet due to family situation on top of work. BP today -- some elevations off and on outside of clinic  AR -- spring time is her worse time. claritin D works best but can't find. She is willing to do claritin + sudafed. Exercise -- will start walking dog  Tolerating medications well     Will be longterm in a week. Will have time for meal prep and healthier eating. Review of Systems   Constitutional: Negative for fatigue and fever.    Respiratory: Negative for shortness of breath. Cardiovascular: Negative for chest pain and leg swelling. The 10-year ASCVD risk score (Mono Samuel et al., 2013) is: 6.2%    Values used to calculate the score:      Age: 60 years      Sex: Female      Is Non- : No      Diabetic: No      Tobacco smoker: No      Systolic Blood Pressure: 095 mmHg      Is BP treated: No      HDL Cholesterol: 53 mg/dL      Total Cholesterol: 260 mg/dL    BP Readings from Last 10 Encounters:   05/23/22 138/88   10/18/21 132/88   07/15/21 (!) 165/82   05/24/21 138/78   04/16/21 (!) 162/90   08/12/20 136/80   03/03/20 (!) 134/90   12/20/19 130/82   08/22/19 (!) 142/76   06/17/19 138/76         Objective   Physical Exam  Constitutional:       General: She is not in acute distress. Appearance: Normal appearance. She is not ill-appearing. Cardiovascular:      Rate and Rhythm: Normal rate and regular rhythm. Heart sounds: No murmur heard. Pulmonary:      Effort: Pulmonary effort is normal. No respiratory distress. Breath sounds: Normal breath sounds. No wheezing. Musculoskeletal:         General: No swelling. Neurological:      Mental Status: She is alert.    Psychiatric:         Mood and Affect: Mood normal.           Wt Readings from Last 3 Encounters:   05/23/22 183 lb 9.6 oz (83.3 kg)   10/18/21 182 lb (82.6 kg)   04/16/21 184 lb (83.5 kg)     Family History   Problem Relation Age of Onset    Cancer Mother     High Blood Pressure Mother     Other Mother     Cancer Father     Heart Disease Father          Lab Results   Component Value Date     05/14/2022    K 4.0 05/14/2022     05/14/2022    CO2 26 05/14/2022    BUN 16 05/14/2022    CREATININE 0.83 05/14/2022    CALCIUM 8.9 05/14/2022    GLUCOSE 94 05/14/2022        Lab Results   Component Value Date    CHOL 260 (H) 05/14/2022    CHOL 253 (H) 10/16/2021    CHOL 340 (H) 04/14/2021     Lab Results   Component Value Date    TRIG 267 (H) 05/14/2022    TRIG 156 (H) 10/16/2021    TRIG 152 (H) 04/14/2021     Lab Results   Component Value Date    HDL 53 05/14/2022    HDL 63 10/16/2021    HDL 52 04/14/2021     Lab Results   Component Value Date    LDLCALC 154 (H) 05/14/2022    LDLCALC 159 (H) 10/16/2021    LDLCALC 258 (H) 04/14/2021     Lab Results   Component Value Date    LABVLDL 53 (H) 05/14/2022    LABVLDL 31 (H) 10/16/2021    LABVLDL 30 04/14/2021     Lab Results   Component Value Date    CHOLHDLRATIO 4.9 05/14/2022    CHOLHDLRATIO 4.0 10/16/2021    CHOLHDLRATIO 6.5 (H) 04/14/2021       No results found for: LABMICR, [id number]    Lab Results   Component Value Date    TSH 0.24 (L) 05/14/2022      No results found for: [id number]   No results found for: MG   Lab Results   Component Value Date    ALT 25 05/14/2022    AST 20 05/14/2022    ALKPHOS 118 05/14/2022    BILITOT 0.5 05/14/2022    BILIDIR 0.0 04/14/2021        Lab Results   Component Value Date    WBC 5.7 08/11/2020    HGB 14.3 08/11/2020    HCT 41.6 08/11/2020    MCV 95 08/11/2020     08/11/2020         An electronic signature was used to authenticate this note.     --Anastasiya Ferro MD

## 2022-05-23 NOTE — PROGRESS NOTES
John Gannon is a 60 y.o. female who presents today for:  Chief Complaint   Patient presents with    6 Month Follow-Up    Hyperlipidemia         HPI:   John Gannon is 60 y.o. who presents today for 6 month f/u. Only complaint is allergies, has had a cough that is improving. Feels well overall. Denies any other complaints. Has been having a lot of increased stress in life over the last few months, feels has been stress eating a lot of sweets. Has been working on increasing exercise again recently, new treadmill and taking dog on walks. Had been eating better and exercising more, became more difficult over the last few months due to stress. Feels zoloft has been working well for moods.      The 10-year ASCVD risk score (Brandy Ramirez, et al., 2013) is: 6.2%    Values used to calculate the score:      Age: 60 years      Sex: Female      Is Non- : No      Diabetic: No      Tobacco smoker: No      Systolic Blood Pressure: 881 mmHg      Is BP treated: No      HDL Cholesterol: 53 mg/dL      Total Cholesterol: 260 mg/dL      Objective:     Vitals:    05/23/22 1644   BP: 138/88   Site: Left Upper Arm   Position: Sitting   Pulse: 72   Resp: 14   Temp: 97.1 °F (36.2 °C)   SpO2: 97%   Weight: 183 lb 9.6 oz (83.3 kg)   Height: 5' 4\" (1.626 m)       Wt Readings from Last 3 Encounters:   05/23/22 183 lb 9.6 oz (83.3 kg)   10/18/21 182 lb (82.6 kg)   04/16/21 184 lb (83.5 kg)       BP Readings from Last 3 Encounters:   05/23/22 138/88   10/18/21 132/88   07/15/21 (!) 165/82       Lab Results   Component Value Date    WBC 5.7 08/11/2020    HGB 14.3 08/11/2020    HCT 41.6 08/11/2020    MCV 95 08/11/2020     08/11/2020     Lab Results   Component Value Date     05/14/2022    K 4.0 05/14/2022     05/14/2022    CO2 26 05/14/2022    BUN 16 05/14/2022    CREATININE 0.83 05/14/2022    GLUCOSE 94 05/14/2022    CALCIUM 8.9 05/14/2022    PROT 6.7 05/14/2022    LABALBU 4.2 05/14/2022    BILITOT 0.5 05/14/2022    ALKPHOS 118 05/14/2022    AST 20 05/14/2022    ALT 25 05/14/2022     Lab Results   Component Value Date    TSH 0.24 (L) 05/14/2022    K2JREAC 6.7 10/21/2017    T4FREE 0.94 05/14/2022     No results found for: LABA1C  No results found for: EAG  Lab Results   Component Value Date    CHOL 260 (H) 05/14/2022    CHOL 253 (H) 10/16/2021    CHOL 340 (H) 04/14/2021     Lab Results   Component Value Date    TRIG 267 (H) 05/14/2022    TRIG 156 (H) 10/16/2021    TRIG 152 (H) 04/14/2021     Lab Results   Component Value Date    HDL 53 05/14/2022    HDL 63 10/16/2021    HDL 52 04/14/2021     Lab Results   Component Value Date    LDLCALC 154 (H) 05/14/2022    LDLCALC 159 (H) 10/16/2021    LDLCALC 258 (H) 04/14/2021       No results found for: LABMICR, [id number]    Review of Systems   Constitutional: Negative for chills and fever. Respiratory: Negative for chest tightness, shortness of breath and wheezing. Cardiovascular: Negative for chest pain and palpitations. Gastrointestinal: Negative for blood in stool, nausea and vomiting. Genitourinary: Negative for hematuria. Skin: Negative for rash. Allergic/Immunologic: Positive for environmental allergies. Neurological: Negative for dizziness and light-headedness. Psychiatric/Behavioral: Negative for self-injury and suicidal ideas. Physical Exam  Constitutional:       Appearance: Normal appearance. HENT:      Head: Normocephalic and atraumatic. Right Ear: External ear normal.      Left Ear: External ear normal.      Mouth/Throat:      Mouth: Mucous membranes are moist.   Eyes:      Extraocular Movements: Extraocular movements intact. Conjunctiva/sclera: Conjunctivae normal.      Pupils: Pupils are equal, round, and reactive to light. Cardiovascular:      Rate and Rhythm: Normal rate and regular rhythm. Heart sounds: Normal heart sounds. No murmur heard. No gallop.     Pulmonary:      Effort: Pulmonary counseled on possibility of resuming statin therapy in the future  - Okay to begin taking niacin 500mg QHS OTC for triglyceridemia, increase dose to 500mg BID if tolerating   - Previously taking claritin-D for allergic symptoms, has had difficulty finding in stock. Discussed beginning Allegra-D or buying separate ingredients of Claritin-D OTC  - Counseled on lifestyle modifications, including continuing to increase exercise and dietary modifications, specifically decreasing sugar, increasing fiber, and the benefits of the DASH or Mediterranean diet          Return in about 6 months (around 11/23/2022). Medications Prescribed:  Orders Placed This Encounter   Medications    ezetimibe (ZETIA) 10 MG tablet     Sig: Take 1 tablet by mouth daily     Dispense:  90 tablet     Refill:  3    sertraline (ZOLOFT) 100 MG tablet     Sig: Take 1.5 tablets by mouth daily     Dispense:  135 tablet     Refill:  3    niacin 500 MG extended release capsule     Sig: Take 1 capsule by mouth nightly     Dispense:  90 capsule     Refill:  3       Future Appointments   Date Time Provider Shruti Cortes   11/29/2022 10:20 AM Randi Valdivia MD [facility] [place] [facility]       Patient given educational materials - see patient instructions. Discussed use, benefit, and sideeffects of prescribed medications. All patient questions answered. Pt voiced understanding. Reviewed health maintenance. Instructed to continue current medications, diet and exercise. Patient agreed with treatment plan. Follow up as directed.      Electronically signed by Júnior Kahn DO on 5/23/2022 at 5:51 PM

## 2022-05-23 NOTE — PATIENT INSTRUCTIONS
Patient Education        Learning About the Mediterranean Diet  What is the [address]? The Mediterranean diet is a style of eating rather than a diet plan. It features foods eaten in [place], [place], [place] and [place], and other countries along the [facility]. It emphasizes eating foods like fish, fruits, vegetables, beans, high-fiber breads and whole grains, nuts, and oliveoil. This style of eating includes limited red meat, cheese, and sweets. Why choose the Mediterranean diet? A Mediterranean-style diet may improve heart health. It contains more fat than other heart-healthy diets. But the fats are mainly from nuts, unsaturated oils (such as fish oils and olive oil), and certain nut or seed oils (such as canola, soybean, or flaxseed oil). These fats may help protect the heart andblood vessels. How can you get started on the Mediterranean diet? Here are some things you can do to switch to a more Mediterranean way of eating. What to eat   Eat a variety of fruits and vegetables each day, such as grapes, blueberries, tomatoes, broccoli, peppers, figs, olives, spinach, eggplant, beans, lentils, and chickpeas.  Eat a variety of whole-grain foods each day, such as oats, brown rice, and whole wheat bread, pasta, and couscous.  Eat fish at least 2 times a week. Try tuna, salmon, mackerel, lake trout, herring, or sardines.  Eat moderate amounts of low-fat dairy products, such as milk, cheese, or yogurt.  Eat moderate amounts of poultry and eggs.  Choose healthy (unsaturated) fats, such as nuts, olive oil, and certain nut or seed oils like canola, soybean, and flaxseed.  Limit unhealthy (saturated) fats, such as butter, palm oil, and coconut oil. And limit fats found in animal products, such as meat and dairy products made with whole milk. Try to eat red meat only a few times a month in very small amounts.  Limit sweets and desserts to only a few times a week.  This includes questions about a medical condition or this instruction, always ask your healthcare professional. [place] [address] any warranty or liability for your use of this information.

## 2022-11-30 LAB
CHOLESTEROL/HDL RATIO: 5.4 RATIO
CHOLESTEROL: 323 MG/DL
HDLC SERPL-MCNC: 60 MG/DL
LDL CHOLESTEROL CALCULATED: 213 MG/DL
LDL/HDL RATIO: 3.6 RATIO
TRIGL SERPL-MCNC: 251 MG/DL
VLDLC SERPL CALC-MCNC: 50 MG/DL

## 2022-12-01 ENCOUNTER — TELEMEDICINE (OUTPATIENT)
Dept: FAMILY MEDICINE CLINIC | Age: 62
End: 2022-12-01
Payer: COMMERCIAL

## 2022-12-01 DIAGNOSIS — M54.50 LOW BACK PAIN RADIATING TO RIGHT LEG: ICD-10-CM

## 2022-12-01 DIAGNOSIS — F42.2 MIXED OBSESSIONAL THOUGHTS AND ACTS: ICD-10-CM

## 2022-12-01 DIAGNOSIS — E78.01 FAMILIAL HYPERCHOLESTEREMIA: Primary | ICD-10-CM

## 2022-12-01 DIAGNOSIS — M79.604 LOW BACK PAIN RADIATING TO RIGHT LEG: ICD-10-CM

## 2022-12-01 PROCEDURE — 99214 OFFICE O/P EST MOD 30 MIN: CPT | Performed by: FAMILY MEDICINE

## 2022-12-01 RX ORDER — SERTRALINE HYDROCHLORIDE 100 MG/1
150 TABLET, FILM COATED ORAL DAILY
Qty: 135 TABLET | Refills: 2 | Status: SHIPPED | OUTPATIENT
Start: 2022-12-01

## 2022-12-01 RX ORDER — SERTRALINE HYDROCHLORIDE 100 MG/1
150 TABLET, FILM COATED ORAL DAILY
Qty: 135 TABLET | Refills: 3 | Status: CANCELLED | OUTPATIENT
Start: 2022-12-01

## 2022-12-01 NOTE — TELEPHONE ENCOUNTER
GAVIN/ [facility] called requesting a refill on the following medications:  Requested Prescriptions      No prescriptions requested or ordered in this encounter       Date of last visit: 12/1/2022  Date of next visit (if applicable):6/1/2023  Date of last refill: 5/23/22  Pharmacy Name: [facility]    Rx pending  #135/3    Bob,  Elizabeth Ovalle LPN

## 2022-12-01 NOTE — PROGRESS NOTES
Victor Manuel Loving (:  1960) is a Established patient, here for evaluation of the following:    Assessment & Plan   Below is the assessment and plan developed based on review of pertinent history, physical exam, labs, studies, and medications. 1. Familial hypercholesteremia  -     Lipid Panel; Future  -     Comprehensive Metabolic Panel; Future  -     Evolocumab (REPATHA) SOSY syringe; Inject 1 mL into the skin every 14 days, Disp-2 mL, R-11Normal  2. Low back pain radiating to right leg  3. Mixed obsessional thoughts and acts  -     sertraline (ZOLOFT) 100 MG tablet; Take 1.5 tablets by mouth daily, Disp-135 tablet, R-2Normal    Patient has failed multiple statin therapies. She is working on improved diet and activity levels. Due to family history, age and continued elevated cholesterol, she would like to proceed with Repatha. We will see what insurance says about this. Using coupon may be helpful as well. For her back, currently no red flags. She will pursue chiropractic care along with stretching exercises. Return in about 6 months (around 2023). Subjective   HPI    High cholesterol -- zetia 10 mg plus niacin tried. Goals were not much improved in fact they are worse. Semi-retired. [employer] at [employer]. [employer]. Physically Activity --  walking more with job. However she knows that this does not seem to have helped her cholesterol. --  failed pravastin,crestor and lipitor    Noticing back trouble, right hip and right knee and right foot issues. H/o herniated disc, used to work for chiropractor. No weakness. No foot drop. Stiffness. Her children [place] workouts so she has a couple of good resources for good stretching. Worsening cholesterol from May. She would like to continue zoloft. Adjusting to new job still.      Family History   Problem Relation Age of Onset    Cancer Mother     High Blood Pressure Mother     Other Mother     Cancer Father     Heart Disease Father      Review of Systems   Constitutional:  Negative for fatigue and fever. Respiratory:  Negative for shortness of breath. Cardiovascular:  Negative for chest pain and leg swelling. Objective   Patient-Reported Vitals  No data recorded     Physical Exam  [INSTRUCTIONS:  \"[x]\" Indicates a positive item  \"[]\" Indicates a negative item  -- DELETE ALL ITEMS NOT EXAMINED]    Constitutional: [x] Appears well-developed and well-nourished [x] No apparent distress      [] Abnormal -     Mental status: [x] Alert and awake  [x] Oriented to person/place/time [x] Able to follow commands    [] Abnormal -     Eyes:   EOM    [x]  Normal    [] Abnormal -   Sclera  [x]  Normal    [] Abnormal -          Discharge [x]  None visible   [] Abnormal -     HENT: [x] Normocephalic, atraumatic  [] Abnormal -   [x] Mouth/Throat: Mucous membranes are moist    External Ears [x] Normal  [] Abnormal -    Neck: [x] No visualized mass [] Abnormal -     Pulmonary/Chest: [x] Respiratory effort normal   [x] No visualized signs of difficulty breathing or respiratory distress        [] Abnormal -    -     Neurological:        [x] No Facial Asymmetry (Cranial nerve 7 motor function) (limited exam due to video visit)          [x] No gaze palsy        [] Abnormal -          Skin:        [x] No significant exanthematous lesions or discoloration noted on facial skin         [] Abnormal -            Psychiatric:       [x] Normal Affect [] Abnormal -        [x] No Hallucinations    Other pertinent observable physical exam findings:-       The ASCVD Risk score (Kristopher GALAN, et al., 2019) failed to calculate for the following reasons:     The valid total cholesterol range is 130 to 320 mg/dL  Lab Results   Component Value Date    CHOL 323 (H) 11/29/2022    CHOL 260 (H) 05/14/2022    CHOL 253 (H) 10/16/2021     Lab Results   Component Value Date    TRIG 251 (H) 11/29/2022    TRIG 267 (H) 05/14/2022    TRIG 156 (H) 10/16/2021     Lab Results Component Value Date    HDL 60 11/29/2022    HDL 53 05/14/2022    HDL 63 10/16/2021     Lab Results   Component Value Date    LDLCALC 213 (H) 11/29/2022    LDLCALC 154 (H) 05/14/2022    LDLCALC 159 (H) 10/16/2021     Lab Results   Component Value Date    LABVLDL 50 (H) 11/29/2022    LABVLDL 53 (H) 05/14/2022    LABVLDL 31 (H) 10/16/2021     Lab Results   Component Value Date    CHOLHDLRATIO 5.4 (H) 11/29/2022    CHOLHDLRATIO 4.9 05/14/2022    CHOLHDLRATIO 4.0 10/16/2021         Time 15-20 min    Nel Pizarro, was evaluated through a synchronous (real-time) audio-video encounter. The patient (or guardian if applicable) is aware that this is a billable service, which includes applicable co-pays. This Virtual Visit was conducted with patient's (and/or legal guardian's) consent. The visit was conducted pursuant to the emergency declaration under the [facility], [address] [place] [address] authority and the shopp and [employer] General Act. Patient identification was verified, and a caregiver was present when appropriate. The patient was located at Home: [address] [address]. Provider was located at [facility] ([address] [place] [address]): [address] [place] [address].         --Toya Britt MD

## 2022-12-01 NOTE — TELEPHONE ENCOUNTER
Christy needs a refil on the Zoloft, called into [facility] on [address]. Also, do you want to see her again in 6 months. Let her know.

## 2022-12-01 NOTE — Clinical Note
Please follow Repatha script sent to pharmacy -- usually the cost is significant and coverage is spotty. We may be using a coupon or she may need to meet other criteria per her insurance.

## 2022-12-04 PROBLEM — M54.50 LOW BACK PAIN RADIATING TO RIGHT LEG: Status: ACTIVE | Noted: 2022-12-04

## 2022-12-04 RX ORDER — EVOLOCUMAB 140 MG/ML
140 INJECTION, SOLUTION SUBCUTANEOUS
Qty: 2 ML | Refills: 11 | Status: SHIPPED | OUTPATIENT
Start: 2022-12-04 | End: 2023-12-04

## 2022-12-04 ASSESSMENT — ENCOUNTER SYMPTOMS: SHORTNESS OF BREATH: 0

## 2022-12-07 NOTE — PROGRESS NOTES
That's a great price. Since it is an injection, she may want some education on a subcutaneous injection -- the technology is a SureClick auto-injector that is described as user friendly. So, it is up to her.

## 2022-12-09 ENCOUNTER — TELEPHONE (OUTPATIENT)
Dept: FAMILY MEDICINE CLINIC | Age: 62
End: 2022-12-09

## 2022-12-09 NOTE — TELEPHONE ENCOUNTER
Called patient about Voncile Freeze. She states she has picked up medication from pharmacy. Pharmacist suggest she watch Internet video on administration. She has plans to do so and also has medical family member that can help with injection. I advised her to call office with any questions or concern and to feel free to make a nurse visit for us to help administer. She has verbalized an understanding.

## 2022-12-09 NOTE — TELEPHONE ENCOUNTER
----- Message from David Greco MD sent at 12/7/2022  5:15 PM EST -----        ----- Message -----  From: Kyleigh Cain CMA  Sent: 12/7/2022  10:08 AM EST  To: David Greco MD          ----- Message -----  From: David Greco MD  Sent: 12/4/2022   6:32 AM EST  To: Latesha Arenas [facility] Clinical Staff    Please follow Repatha script sent to pharmacy -- usually the cost is significant and coverage is spotty. We may be using a coupon or she may need to meet other criteria per her insurance.

## 2023-03-13 DIAGNOSIS — F42.2 MIXED OBSESSIONAL THOUGHTS AND ACTS: ICD-10-CM

## 2023-03-13 RX ORDER — SERTRALINE HYDROCHLORIDE 100 MG/1
150 TABLET, FILM COATED ORAL DAILY
Qty: 135 TABLET | Refills: 2 | Status: SHIPPED | OUTPATIENT
Start: 2023-03-13

## 2023-03-13 NOTE — TELEPHONE ENCOUNTER
Patient called in stating that she had picked up her new script of ZOLOFT 100 MG from [facility] [place] on [address]. She took her last dose of her old one last nigh. Well her new ones got thrown away and she is unsure of what to do since she is completely out. Please contact patient with what to do.

## 2023-03-13 NOTE — TELEPHONE ENCOUNTER
While insurance won't cover another refill, she may be able to pay cash (with a good rx coupon) for a replacement.  She can talk to pharmacy if they would replace.  She might need another script.  If that pharmacy won't work with her, then we can send a one time script to another pharmacy.

## 2023-03-13 NOTE — TELEPHONE ENCOUNTER
Patient is working with her pharmacy to refill her medication through [facility]. Patient is using her last refill so will need a new script sent into her pharmacy for next time. Anthony Clinton called requesting a refill on the following medications:  Requested Prescriptions     Pending Prescriptions Disp Refills    sertraline (ZOLOFT) 100 MG tablet 135 tablet 2     Sig: Take 1.5 tablets by mouth daily       Date of last visit: 12/1/2022  Date of next visit (if applicable):6/1/2023  Date of last refill: 12/1/22  Pharmacy Name: [facility]    Rx pending #135/2      Thanks,  Aime Dorsey LPN

## 2023-03-13 NOTE — TELEPHONE ENCOUNTER
Patient will check with her current pharmacy and use the [facility] if applicable, and if they will not work with patient then she will call us back.